# Patient Record
Sex: FEMALE | Race: WHITE | NOT HISPANIC OR LATINO | Employment: FULL TIME | RURAL
[De-identification: names, ages, dates, MRNs, and addresses within clinical notes are randomized per-mention and may not be internally consistent; named-entity substitution may affect disease eponyms.]

---

## 2021-05-25 ENCOUNTER — OFFICE VISIT (OUTPATIENT)
Dept: FAMILY MEDICINE | Facility: CLINIC | Age: 51
End: 2021-05-25
Payer: COMMERCIAL

## 2021-05-25 VITALS
BODY MASS INDEX: 38.03 KG/M2 | WEIGHT: 236.63 LBS | RESPIRATION RATE: 16 BRPM | DIASTOLIC BLOOD PRESSURE: 90 MMHG | HEART RATE: 80 BPM | SYSTOLIC BLOOD PRESSURE: 140 MMHG | HEIGHT: 66 IN

## 2021-05-25 DIAGNOSIS — I10 ESSENTIAL HYPERTENSION: ICD-10-CM

## 2021-05-25 DIAGNOSIS — Z79.899 ENCOUNTER FOR LONG-TERM (CURRENT) USE OF OTHER MEDICATIONS: Primary | ICD-10-CM

## 2021-05-25 DIAGNOSIS — M54.50 CHRONIC BILATERAL LOW BACK PAIN WITHOUT SCIATICA: ICD-10-CM

## 2021-05-25 DIAGNOSIS — G89.29 CHRONIC BILATERAL LOW BACK PAIN WITHOUT SCIATICA: ICD-10-CM

## 2021-05-25 LAB
CTP QC/QA: YES
POC (AMP) AMPHETAMINE: NEGATIVE
POC (BAR) BARBITURATES: NEGATIVE
POC (BUP) BUPRENORPHINE: NEGATIVE
POC (BZO) BENZODIAZEPINES: NEGATIVE
POC (COC) COCAINE: NEGATIVE
POC (MDMA) METHYLENEDIOXYMETHAMPHETAMINE 3,4: NEGATIVE
POC (MET) METHAMPHETAMINE: NEGATIVE
POC (MOP) OPIATES: NEGATIVE
POC (MTD) METHADONE: NEGATIVE
POC (OXY) OXYCODONE: NEGATIVE
POC (PCP) PHENCYCLIDINE: NEGATIVE
POC (TCA) TRICYCLIC ANTIDEPRESSANTS: NEGATIVE
POC TEMPERATURE (URINE): 90
POC THC: NEGATIVE

## 2021-05-25 PROCEDURE — 80305 DRUG TEST PRSMV DIR OPT OBS: CPT | Mod: QW,,, | Performed by: FAMILY MEDICINE

## 2021-05-25 PROCEDURE — 3008F BODY MASS INDEX DOCD: CPT | Mod: ,,, | Performed by: FAMILY MEDICINE

## 2021-05-25 PROCEDURE — 3008F PR BODY MASS INDEX (BMI) DOCUMENTED: ICD-10-PCS | Mod: ,,, | Performed by: FAMILY MEDICINE

## 2021-05-25 PROCEDURE — 80305 POCT URINE DRUG SCREEN PRESUMP: ICD-10-PCS | Mod: QW,,, | Performed by: FAMILY MEDICINE

## 2021-05-25 PROCEDURE — 99213 OFFICE O/P EST LOW 20 MIN: CPT | Mod: ,,, | Performed by: FAMILY MEDICINE

## 2021-05-25 PROCEDURE — 99213 PR OFFICE/OUTPT VISIT, EST, LEVL III, 20-29 MIN: ICD-10-PCS | Mod: ,,, | Performed by: FAMILY MEDICINE

## 2021-05-25 RX ORDER — TRAMADOL HYDROCHLORIDE 50 MG/1
TABLET ORAL
COMMUNITY
Start: 2021-05-03 | End: 2021-05-25 | Stop reason: SDUPTHER

## 2021-05-25 RX ORDER — VALSARTAN AND HYDROCHLOROTHIAZIDE 320; 12.5 MG/1; MG/1
1 TABLET, FILM COATED ORAL DAILY
COMMUNITY
Start: 2021-05-07 | End: 2023-05-11

## 2021-05-25 RX ORDER — TRAMADOL HYDROCHLORIDE 50 MG/1
TABLET ORAL
Qty: 180 TABLET | Refills: 1 | Status: SHIPPED | OUTPATIENT
Start: 2021-05-25 | End: 2021-11-30 | Stop reason: SDUPTHER

## 2021-11-30 ENCOUNTER — OFFICE VISIT (OUTPATIENT)
Dept: FAMILY MEDICINE | Facility: CLINIC | Age: 51
End: 2021-11-30
Payer: COMMERCIAL

## 2021-11-30 VITALS
WEIGHT: 234 LBS | TEMPERATURE: 98 F | RESPIRATION RATE: 16 BRPM | DIASTOLIC BLOOD PRESSURE: 100 MMHG | HEART RATE: 80 BPM | HEIGHT: 66 IN | BODY MASS INDEX: 37.61 KG/M2 | SYSTOLIC BLOOD PRESSURE: 170 MMHG

## 2021-11-30 DIAGNOSIS — M54.50 CHRONIC BILATERAL LOW BACK PAIN WITHOUT SCIATICA: ICD-10-CM

## 2021-11-30 DIAGNOSIS — G89.29 CHRONIC BILATERAL LOW BACK PAIN WITHOUT SCIATICA: ICD-10-CM

## 2021-11-30 DIAGNOSIS — Z79.899 ENCOUNTER FOR LONG-TERM (CURRENT) USE OF OTHER MEDICATIONS: Primary | ICD-10-CM

## 2021-11-30 LAB
CTP QC/QA: YES
POC (AMP) AMPHETAMINE: NEGATIVE
POC (BAR) BARBITURATES: NEGATIVE
POC (BUP) BUPRENORPHINE: NEGATIVE
POC (BZO) BENZODIAZEPINES: NEGATIVE
POC (COC) COCAINE: NEGATIVE
POC (MDMA) METHYLENEDIOXYMETHAMPHETAMINE 3,4: NEGATIVE
POC (MET) METHAMPHETAMINE: NEGATIVE
POC (MOP) OPIATES: NEGATIVE
POC (MTD) METHADONE: NEGATIVE
POC (OXY) OXYCODONE: NEGATIVE
POC (PCP) PHENCYCLIDINE: NEGATIVE
POC (TCA) TRICYCLIC ANTIDEPRESSANTS: NORMAL
POC TEMPERATURE (URINE): 90
POC THC: NEGATIVE

## 2021-11-30 PROCEDURE — 80305 POCT URINE DRUG SCREEN PRESUMP: ICD-10-PCS | Mod: QW,,, | Performed by: FAMILY MEDICINE

## 2021-11-30 PROCEDURE — 99213 PR OFFICE/OUTPT VISIT, EST, LEVL III, 20-29 MIN: ICD-10-PCS | Mod: ,,, | Performed by: FAMILY MEDICINE

## 2021-11-30 PROCEDURE — 99213 OFFICE O/P EST LOW 20 MIN: CPT | Mod: ,,, | Performed by: FAMILY MEDICINE

## 2021-11-30 PROCEDURE — 80305 DRUG TEST PRSMV DIR OPT OBS: CPT | Mod: QW,,, | Performed by: FAMILY MEDICINE

## 2021-11-30 RX ORDER — TRAMADOL HYDROCHLORIDE 50 MG/1
TABLET ORAL
Qty: 180 TABLET | Refills: 1 | Status: SHIPPED | OUTPATIENT
Start: 2021-11-30 | End: 2022-02-28 | Stop reason: SDUPTHER

## 2022-02-28 ENCOUNTER — OFFICE VISIT (OUTPATIENT)
Dept: FAMILY MEDICINE | Facility: CLINIC | Age: 52
End: 2022-02-28
Payer: COMMERCIAL

## 2022-02-28 VITALS
DIASTOLIC BLOOD PRESSURE: 98 MMHG | BODY MASS INDEX: 38.09 KG/M2 | HEART RATE: 77 BPM | SYSTOLIC BLOOD PRESSURE: 170 MMHG | HEIGHT: 66 IN | RESPIRATION RATE: 18 BRPM | WEIGHT: 237 LBS

## 2022-02-28 DIAGNOSIS — I10 ESSENTIAL HYPERTENSION: Primary | ICD-10-CM

## 2022-02-28 DIAGNOSIS — M54.50 CHRONIC BILATERAL LOW BACK PAIN WITHOUT SCIATICA: ICD-10-CM

## 2022-02-28 DIAGNOSIS — G89.29 CHRONIC BILATERAL LOW BACK PAIN WITHOUT SCIATICA: ICD-10-CM

## 2022-02-28 DIAGNOSIS — Z79.899 ENCOUNTER FOR LONG-TERM (CURRENT) USE OF OTHER MEDICATIONS: ICD-10-CM

## 2022-02-28 LAB
ALBUMIN SERPL BCP-MCNC: 3.8 G/DL (ref 3.5–5)
ALBUMIN/GLOB SERPL: 1.1 {RATIO}
ALP SERPL-CCNC: 61 U/L (ref 41–108)
ALT SERPL W P-5'-P-CCNC: 33 U/L (ref 13–56)
ANION GAP SERPL CALCULATED.3IONS-SCNC: 12 MMOL/L (ref 7–16)
AST SERPL W P-5'-P-CCNC: 21 U/L (ref 15–37)
BASOPHILS # BLD AUTO: 0.03 K/UL (ref 0–0.2)
BASOPHILS NFR BLD AUTO: 0.4 % (ref 0–1)
BILIRUB SERPL-MCNC: 0.5 MG/DL (ref 0–1.2)
BUN SERPL-MCNC: 11 MG/DL (ref 7–18)
BUN/CREAT SERPL: 17 (ref 6–20)
CALCIUM SERPL-MCNC: 9.1 MG/DL (ref 8.5–10.1)
CHLORIDE SERPL-SCNC: 101 MMOL/L (ref 98–107)
CHOLEST SERPL-MCNC: 225 MG/DL (ref 0–200)
CHOLEST/HDLC SERPL: 3.1 {RATIO}
CO2 SERPL-SCNC: 28 MMOL/L (ref 21–32)
CREAT SERPL-MCNC: 0.63 MG/DL (ref 0.55–1.02)
CTP QC/QA: YES
DIFFERENTIAL METHOD BLD: ABNORMAL
EOSINOPHIL # BLD AUTO: 0.06 K/UL (ref 0–0.5)
EOSINOPHIL NFR BLD AUTO: 0.8 % (ref 1–4)
ERYTHROCYTE [DISTWIDTH] IN BLOOD BY AUTOMATED COUNT: 13.2 % (ref 11.5–14.5)
GLOBULIN SER-MCNC: 3.4 G/DL (ref 2–4)
GLUCOSE SERPL-MCNC: 89 MG/DL (ref 74–106)
HCT VFR BLD AUTO: 42 % (ref 38–47)
HDLC SERPL-MCNC: 73 MG/DL (ref 40–60)
HGB BLD-MCNC: 13.8 G/DL (ref 12–16)
IMM GRANULOCYTES # BLD AUTO: 0.02 K/UL (ref 0–0.04)
IMM GRANULOCYTES NFR BLD: 0.3 % (ref 0–0.4)
LDLC SERPL CALC-MCNC: 129 MG/DL
LDLC/HDLC SERPL: 1.8 {RATIO}
LYMPHOCYTES # BLD AUTO: 2.23 K/UL (ref 1–4.8)
LYMPHOCYTES NFR BLD AUTO: 29.9 % (ref 27–41)
MCH RBC QN AUTO: 29 PG (ref 27–31)
MCHC RBC AUTO-ENTMCNC: 32.9 G/DL (ref 32–36)
MCV RBC AUTO: 88.2 FL (ref 80–96)
MONOCYTES # BLD AUTO: 0.44 K/UL (ref 0–0.8)
MONOCYTES NFR BLD AUTO: 5.9 % (ref 2–6)
MPC BLD CALC-MCNC: 9.6 FL (ref 9.4–12.4)
NEUTROPHILS # BLD AUTO: 4.67 K/UL (ref 1.8–7.7)
NEUTROPHILS NFR BLD AUTO: 62.7 % (ref 53–65)
NONHDLC SERPL-MCNC: 152 MG/DL
NRBC # BLD AUTO: 0 X10E3/UL
NRBC, AUTO (.00): 0 %
PLATELET # BLD AUTO: 302 K/UL (ref 150–400)
POC (AMP) AMPHETAMINE: NEGATIVE
POC (BAR) BARBITURATES: NEGATIVE
POC (BUP) BUPRENORPHINE: NEGATIVE
POC (BZO) BENZODIAZEPINES: NEGATIVE
POC (COC) COCAINE: NEGATIVE
POC (MDMA) METHYLENEDIOXYMETHAMPHETAMINE 3,4: NEGATIVE
POC (MET) METHAMPHETAMINE: NEGATIVE
POC (MOP) OPIATES: NEGATIVE
POC (MTD) METHADONE: NEGATIVE
POC (OXY) OXYCODONE: NEGATIVE
POC (PCP) PHENCYCLIDINE: NEGATIVE
POC (TCA) TRICYCLIC ANTIDEPRESSANTS: NORMAL
POC TEMPERATURE (URINE): 90
POC THC: NEGATIVE
POTASSIUM SERPL-SCNC: 3.5 MMOL/L (ref 3.5–5.1)
PROT SERPL-MCNC: 7.2 G/DL (ref 6.4–8.2)
RBC # BLD AUTO: 4.76 M/UL (ref 4.2–5.4)
SODIUM SERPL-SCNC: 137 MMOL/L (ref 136–145)
TRIGL SERPL-MCNC: 113 MG/DL (ref 35–150)
VLDLC SERPL-MCNC: 23 MG/DL
WBC # BLD AUTO: 7.45 K/UL (ref 4.5–11)

## 2022-02-28 PROCEDURE — 3008F BODY MASS INDEX DOCD: CPT | Mod: ,,, | Performed by: FAMILY MEDICINE

## 2022-02-28 PROCEDURE — 3080F DIAST BP >= 90 MM HG: CPT | Mod: ,,, | Performed by: FAMILY MEDICINE

## 2022-02-28 PROCEDURE — 1160F PR REVIEW ALL MEDS BY PRESCRIBER/CLIN PHARMACIST DOCUMENTED: ICD-10-PCS | Mod: ,,, | Performed by: FAMILY MEDICINE

## 2022-02-28 PROCEDURE — 80305 DRUG TEST PRSMV DIR OPT OBS: CPT | Mod: QW,,, | Performed by: FAMILY MEDICINE

## 2022-02-28 PROCEDURE — 1159F MED LIST DOCD IN RCRD: CPT | Mod: ,,, | Performed by: FAMILY MEDICINE

## 2022-02-28 PROCEDURE — 80053 COMPREHENSIVE METABOLIC PANEL: ICD-10-PCS | Mod: ,,, | Performed by: CLINICAL MEDICAL LABORATORY

## 2022-02-28 PROCEDURE — 1159F PR MEDICATION LIST DOCUMENTED IN MEDICAL RECORD: ICD-10-PCS | Mod: ,,, | Performed by: FAMILY MEDICINE

## 2022-02-28 PROCEDURE — 3077F PR MOST RECENT SYSTOLIC BLOOD PRESSURE >= 140 MM HG: ICD-10-PCS | Mod: ,,, | Performed by: FAMILY MEDICINE

## 2022-02-28 PROCEDURE — 85025 CBC WITH DIFFERENTIAL: ICD-10-PCS | Mod: ,,, | Performed by: CLINICAL MEDICAL LABORATORY

## 2022-02-28 PROCEDURE — 1160F RVW MEDS BY RX/DR IN RCRD: CPT | Mod: ,,, | Performed by: FAMILY MEDICINE

## 2022-02-28 PROCEDURE — 80305 POCT URINE DRUG SCREEN PRESUMP: ICD-10-PCS | Mod: QW,,, | Performed by: FAMILY MEDICINE

## 2022-02-28 PROCEDURE — 85025 COMPLETE CBC W/AUTO DIFF WBC: CPT | Mod: ,,, | Performed by: CLINICAL MEDICAL LABORATORY

## 2022-02-28 PROCEDURE — 3080F PR MOST RECENT DIASTOLIC BLOOD PRESSURE >= 90 MM HG: ICD-10-PCS | Mod: ,,, | Performed by: FAMILY MEDICINE

## 2022-02-28 PROCEDURE — 99214 PR OFFICE/OUTPT VISIT, EST, LEVL IV, 30-39 MIN: ICD-10-PCS | Mod: ,,, | Performed by: FAMILY MEDICINE

## 2022-02-28 PROCEDURE — 99214 OFFICE O/P EST MOD 30 MIN: CPT | Mod: ,,, | Performed by: FAMILY MEDICINE

## 2022-02-28 PROCEDURE — 3008F PR BODY MASS INDEX (BMI) DOCUMENTED: ICD-10-PCS | Mod: ,,, | Performed by: FAMILY MEDICINE

## 2022-02-28 PROCEDURE — 80061 LIPID PANEL: ICD-10-PCS | Mod: ,,, | Performed by: CLINICAL MEDICAL LABORATORY

## 2022-02-28 PROCEDURE — 3077F SYST BP >= 140 MM HG: CPT | Mod: ,,, | Performed by: FAMILY MEDICINE

## 2022-02-28 PROCEDURE — 80061 LIPID PANEL: CPT | Mod: ,,, | Performed by: CLINICAL MEDICAL LABORATORY

## 2022-02-28 PROCEDURE — 80053 COMPREHEN METABOLIC PANEL: CPT | Mod: ,,, | Performed by: CLINICAL MEDICAL LABORATORY

## 2022-02-28 RX ORDER — CHLORTHALIDONE 25 MG/1
25 TABLET ORAL DAILY
Qty: 90 TABLET | Refills: 3 | Status: SHIPPED | OUTPATIENT
Start: 2022-02-28 | End: 2022-08-26 | Stop reason: SDUPTHER

## 2022-02-28 RX ORDER — TRAMADOL HYDROCHLORIDE 50 MG/1
TABLET ORAL
Qty: 180 TABLET | Refills: 1 | Status: SHIPPED | OUTPATIENT
Start: 2022-02-28 | End: 2022-08-26 | Stop reason: SDUPTHER

## 2022-02-28 NOTE — PROGRESS NOTES
Anthony Monge MD        PATIENT NAME: Celine Chandler  : 1970  DATE: 22  MRN: [id number]      Billing Provider: Anthony Monge MD  Level of Service: [place] OFFICE/OUTPT VISIT, EST, LEVL IV, 30-39 MIN  Patient PCP Information     Provider PCP Type    Anthony Monge MD General          Reason for Visit / Chief Complaint: No chief complaint on file.       Update PCP  Update Chief Complaint         History of Present Illness / Problem Focused Workflow     Celine Chandler presents to the clinic with No chief complaint on file.     For follow-up of hypertension.  She was switched to valsartan due to an Ace cough but now has noticed a cough on valsartan as well      Review of Systems     Review of Systems   Constitutional: Negative for activity change, appetite change, fever and unexpected weight change.   HENT: Negative for congestion, rhinorrhea, sinus pressure, sinus pain, sore throat and trouble swallowing.    Eyes: Negative for photophobia, pain, discharge and visual disturbance.   Respiratory: Positive for cough. Negative for chest tightness, wheezing and stridor.    Cardiovascular: Negative for chest pain, palpitations and leg swelling.   Gastrointestinal: Negative for abdominal pain, blood in stool, constipation, diarrhea and nausea.   Endocrine: Negative for polydipsia, polyphagia and polyuria.   Genitourinary: Negative for difficulty urinating, flank pain and hematuria.   Musculoskeletal: Positive for arthralgias and back pain. Negative for neck pain.   Skin: Negative for rash.   Allergic/Immunologic: Negative for food allergies.   Neurological: Negative for dizziness, tremors, seizures, syncope, weakness (global weakness) and headaches.   Psychiatric/Behavioral: Negative for behavioral problems, confusion, decreased concentration, dysphoric mood and hallucinations. The patient is not nervous/anxious.         Medical / Social / Family History     Past Medical History:   Diagnosis Date    Arthritis      Hypertension        Past Surgical History:   Procedure Laterality Date     SECTION      CHOLECYSTECTOMY         Social History  Ms.  reports that she has never smoked. She has never used smokeless tobacco.    Family History  MsDorinda's family history includes Cancer in her mother; Hypertension in her father and mother.    Medications and Allergies     Medications  Outpatient Medications Marked as Taking for the 22 encounter (Office Visit) with Anthony Monge MD   Medication Sig Dispense Refill    valsartan-hydrochlorothiazide (DIOVAN-HCT) 320-12.5 mg per tablet Take 1 tablet by mouth once daily.      [DISCONTINUED] traMADoL (ULTRAM) 50 mg tablet TAKE 1 TABLET BY MOUTH TWICE A DAY WITH FOOD ..MAY CAUSE DROWSINESS- AVOID ALCOHOL 180 tablet 1       Allergies  Review of patient's allergies indicates:  No Known Allergies    Physical Examination     Vitals:    22 1558   BP: (!) 170/98   Pulse: 77   Resp: 18     Physical Exam  Constitutional:       General: She is not in acute distress.     Appearance: Normal appearance.   HENT:      Head: Normocephalic.      Right Ear: Tympanic membrane and ear canal normal.      Left Ear: Tympanic membrane and ear canal normal.      Nose: Nose normal.      Mouth/Throat:      Mouth: Mucous membranes are moist.      Pharynx: No oropharyngeal exudate.   Eyes:      Extraocular Movements: Extraocular movements intact.      Pupils: Pupils are equal, round, and reactive to light.   Cardiovascular:      Rate and Rhythm: Normal rate and regular rhythm.      Heart sounds: No murmur heard.  Pulmonary:      Effort: Pulmonary effort is normal.      Breath sounds: Normal breath sounds. No wheezing.   Abdominal:      General: Abdomen is flat. Bowel sounds are normal.      Palpations: Abdomen is soft.      Hernia: No hernia is present.   Musculoskeletal:         General: Normal range of motion.      Cervical back: Normal range of motion and neck supple.      Right lower leg: No  edema.      Left lower leg: No edema.   Lymphadenopathy:      Cervical: No cervical adenopathy.   Skin:     General: Skin is warm and dry.      Coloration: Skin is not jaundiced.      Findings: No lesion.   Neurological:      General: No focal deficit present.      Mental Status: She is alert and oriented to person, place, and time.      Cranial Nerves: No cranial nerve deficit.      Gait: Gait normal.   Psychiatric:         Mood and Affect: Mood normal.         Behavior: Behavior normal.         Judgment: Judgment normal.          Assessment and Plan (including Health Maintenance)      Problem List  Smart Sets  Document Outside HM   :    Plan:   Essential hypertension  -     CBC Auto Differential  -     Comprehensive Metabolic Panel  -     Lipid Panel  -     chlorthalidone (HYGROTEN) 25 MG Tab; Take 1 tablet (25 mg total) by mouth once daily.  Dispense: 90 tablet; Refill: 3    Encounter for long-term (current) use of other medications  -     POCT Urine Drug Screen Presump    Chronic bilateral low back pain without sciatica  -     traMADoL (ULTRAM) 50 mg tablet; TAKE 1 TABLET BY MOUTH TWICE A DAY WITH FOOD ..MAY CAUSE DROWSINESS- AVOID ALCOHOL  Dispense: 180 tablet; Refill: 1           Health Maintenance Due   Topic Date Due    Hepatitis C Screening  Never done    Cervical Cancer Screening  Never done    Lipid Panel  Never done    COVID-19 Vaccine (1) Never done    HIV Screening  Never done    TETANUS VACCINE  Never done    Mammogram  Never done    Colorectal Cancer Screening  Never done    Shingles Vaccine (1 of 2) Never done    Influenza Vaccine (1) Never done       Problem List Items Addressed This Visit        Cardiac/Vascular    Essential hypertension - Primary    Relevant Medications    chlorthalidone (HYGROTEN) 25 MG Tab    Other Relevant Orders    CBC Auto Differential    Comprehensive Metabolic Panel    Lipid Panel      Other Visit Diagnoses     Encounter for long-term (current) use of other  medications        Relevant Orders    POCT Urine Drug Screen Presump (Completed)    Chronic bilateral low back pain without sciatica        Relevant Medications    traMADoL (ULTRAM) 50 mg tablet          The patient has no Health Maintenance topics of status Not Due    Future Appointments   Date Time Provider Department Center   3/21/2022  3:30 PM Anthony Monge MD [facility]        There are no Patient Instructions on file for this visit.  Follow up in about 4 weeks (around 3/28/2022) for routine followup.     Signature:  Anthony Monge MD      Date of encounter: 2/28/22

## 2022-03-20 ENCOUNTER — PATIENT MESSAGE (OUTPATIENT)
Dept: FAMILY MEDICINE | Facility: CLINIC | Age: 52
End: 2022-03-20
Payer: COMMERCIAL

## 2022-03-22 DIAGNOSIS — Z29.9 UNSPECIFIED PROPHYLACTIC OR TREATMENT MEASURE: Primary | ICD-10-CM

## 2022-03-22 RX ORDER — POTASSIUM CHLORIDE 600 MG/1
8 TABLET, FILM COATED, EXTENDED RELEASE ORAL ONCE
COMMUNITY
Start: 2022-03-22 | End: 2022-03-22 | Stop reason: SDUPTHER

## 2022-03-22 RX ORDER — POTASSIUM CHLORIDE 600 MG/1
8 TABLET, FILM COATED, EXTENDED RELEASE ORAL ONCE
Qty: 30 TABLET | Refills: 0 | Status: SHIPPED | OUTPATIENT
Start: 2022-03-22 | End: 2022-03-22

## 2022-08-26 ENCOUNTER — OFFICE VISIT (OUTPATIENT)
Dept: FAMILY MEDICINE | Facility: CLINIC | Age: 52
End: 2022-08-26
Payer: COMMERCIAL

## 2022-08-26 VITALS
BODY MASS INDEX: 38.73 KG/M2 | HEART RATE: 78 BPM | SYSTOLIC BLOOD PRESSURE: 122 MMHG | HEIGHT: 66 IN | DIASTOLIC BLOOD PRESSURE: 68 MMHG | OXYGEN SATURATION: 98 % | RESPIRATION RATE: 18 BRPM | WEIGHT: 241 LBS

## 2022-08-26 DIAGNOSIS — I10 ESSENTIAL HYPERTENSION: ICD-10-CM

## 2022-08-26 DIAGNOSIS — G89.29 CHRONIC BILATERAL LOW BACK PAIN WITHOUT SCIATICA: Primary | ICD-10-CM

## 2022-08-26 DIAGNOSIS — M54.50 CHRONIC BILATERAL LOW BACK PAIN WITHOUT SCIATICA: Primary | ICD-10-CM

## 2022-08-26 DIAGNOSIS — Z79.899 ENCOUNTER FOR LONG-TERM (CURRENT) USE OF OTHER MEDICATIONS: ICD-10-CM

## 2022-08-26 LAB
CTP QC/QA: YES
POC (AMP) AMPHETAMINE: NEGATIVE
POC (BAR) BARBITURATES: NEGATIVE
POC (BUP) BUPRENORPHINE: NEGATIVE
POC (BZO) BENZODIAZEPINES: NEGATIVE
POC (COC) COCAINE: NEGATIVE
POC (MDMA) METHYLENEDIOXYMETHAMPHETAMINE 3,4: NEGATIVE
POC (MET) METHAMPHETAMINE: NEGATIVE
POC (MOP) OPIATES: NEGATIVE
POC (MTD) METHADONE: NEGATIVE
POC (OXY) OXYCODONE: NEGATIVE
POC (PCP) PHENCYCLIDINE: NEGATIVE
POC (TCA) TRICYCLIC ANTIDEPRESSANTS: NORMAL
POC TEMPERATURE (URINE): 92
POC THC: NEGATIVE

## 2022-08-26 PROCEDURE — 3078F PR MOST RECENT DIASTOLIC BLOOD PRESSURE < 80 MM HG: ICD-10-PCS | Mod: ,,, | Performed by: FAMILY MEDICINE

## 2022-08-26 PROCEDURE — 3074F PR MOST RECENT SYSTOLIC BLOOD PRESSURE < 130 MM HG: ICD-10-PCS | Mod: ,,, | Performed by: FAMILY MEDICINE

## 2022-08-26 PROCEDURE — 3078F DIAST BP <80 MM HG: CPT | Mod: ,,, | Performed by: FAMILY MEDICINE

## 2022-08-26 PROCEDURE — 3008F PR BODY MASS INDEX (BMI) DOCUMENTED: ICD-10-PCS | Mod: ,,, | Performed by: FAMILY MEDICINE

## 2022-08-26 PROCEDURE — 3008F BODY MASS INDEX DOCD: CPT | Mod: ,,, | Performed by: FAMILY MEDICINE

## 2022-08-26 PROCEDURE — 80305 DRUG TEST PRSMV DIR OPT OBS: CPT | Mod: QW,,, | Performed by: FAMILY MEDICINE

## 2022-08-26 PROCEDURE — 3074F SYST BP LT 130 MM HG: CPT | Mod: ,,, | Performed by: FAMILY MEDICINE

## 2022-08-26 PROCEDURE — 1159F PR MEDICATION LIST DOCUMENTED IN MEDICAL RECORD: ICD-10-PCS | Mod: ,,, | Performed by: FAMILY MEDICINE

## 2022-08-26 PROCEDURE — 99213 OFFICE O/P EST LOW 20 MIN: CPT | Mod: ,,, | Performed by: FAMILY MEDICINE

## 2022-08-26 PROCEDURE — 99213 PR OFFICE/OUTPT VISIT, EST, LEVL III, 20-29 MIN: ICD-10-PCS | Mod: ,,, | Performed by: FAMILY MEDICINE

## 2022-08-26 PROCEDURE — 1160F PR REVIEW ALL MEDS BY PRESCRIBER/CLIN PHARMACIST DOCUMENTED: ICD-10-PCS | Mod: ,,, | Performed by: FAMILY MEDICINE

## 2022-08-26 PROCEDURE — 1159F MED LIST DOCD IN RCRD: CPT | Mod: ,,, | Performed by: FAMILY MEDICINE

## 2022-08-26 PROCEDURE — 80305 POCT URINE DRUG SCREEN PRESUMP: ICD-10-PCS | Mod: QW,,, | Performed by: FAMILY MEDICINE

## 2022-08-26 PROCEDURE — 1160F RVW MEDS BY RX/DR IN RCRD: CPT | Mod: ,,, | Performed by: FAMILY MEDICINE

## 2022-08-26 RX ORDER — CHLORTHALIDONE 25 MG/1
25 TABLET ORAL DAILY
Qty: 90 TABLET | Refills: 3 | Status: SHIPPED | OUTPATIENT
Start: 2022-08-26 | End: 2022-11-29 | Stop reason: SDUPTHER

## 2022-08-26 RX ORDER — TRAMADOL HYDROCHLORIDE 50 MG/1
TABLET ORAL
Qty: 180 TABLET | Refills: 1 | Status: SHIPPED | OUTPATIENT
Start: 2022-08-26 | End: 2022-11-29 | Stop reason: SDUPTHER

## 2022-08-26 NOTE — PROGRESS NOTES
Anthony Monge MD        PATIENT NAME: Celine Chandler  : 1970  DATE: 22  MRN: [id number]      Billing Provider: Anthony Monge MD  Level of Service: [place] OFFICE/OUTPT VISIT, EST, LEVL III, 20-29 MIN  Patient PCP Information       Provider PCP Type    Anthony Monge MD General            Reason for Visit / Chief Complaint: Medication Refill       Update PCP  Update Chief Complaint         History of Present Illness / Problem Focused Workflow     Celine Chandler presents to the clinic with Medication Refill     Routine followup.  No significant interval change.      Medication Refill  Pertinent negatives include no abdominal pain, arthralgias, chest pain, congestion, coughing, fever, headaches, nausea, neck pain, rash, sore throat or weakness (global weakness).     Review of Systems     Review of Systems   Constitutional:  Negative for activity change, appetite change, fever and unexpected weight change.   HENT:  Negative for congestion, rhinorrhea, sinus pressure, sinus pain, sore throat and trouble swallowing.    Eyes:  Negative for photophobia, pain, discharge and visual disturbance.   Respiratory:  Negative for cough, chest tightness, wheezing and stridor.    Cardiovascular:  Negative for chest pain, palpitations and leg swelling.   Gastrointestinal:  Negative for abdominal pain, blood in stool, constipation, diarrhea and nausea.   Endocrine: Negative for polydipsia, polyphagia and polyuria.   Genitourinary:  Negative for difficulty urinating, flank pain and hematuria.   Musculoskeletal:  Positive for back pain. Negative for arthralgias and neck pain.   Skin:  Negative for rash.   Allergic/Immunologic: Negative for food allergies.   Neurological:  Negative for dizziness, tremors, seizures, syncope, weakness (global weakness) and headaches.   Psychiatric/Behavioral:  Negative for behavioral problems, confusion, decreased concentration, dysphoric mood and hallucinations. The patient is not nervous/anxious.        Medical / Social / Family History     Past Medical History:   Diagnosis Date    Arthritis     Hypertension        Past Surgical History:   Procedure Laterality Date     SECTION      CHOLECYSTECTOMY         Social History  Ms.  reports that she has never smoked. She has never used smokeless tobacco.    Family History  Ms.'s family history includes Cancer in her mother; Hypertension in her father and mother.    Medications and Allergies     Medications  No outpatient medications have been marked as taking for the 22 encounter (Office Visit) with Anthony Monge MD.       Allergies  Review of patient's allergies indicates:  No Known Allergies    Physical Examination     Vitals:    22 1020   BP: 122/68   Pulse: 78   Resp: 18     Physical Exam  Constitutional:       General: She is not in acute distress.     Appearance: Normal appearance.   HENT:      Head: Normocephalic.      Right Ear: Tympanic membrane and ear canal normal.      Left Ear: Tympanic membrane and ear canal normal.      Nose: Nose normal.      Mouth/Throat:      Mouth: Mucous membranes are moist.      Pharynx: No oropharyngeal exudate.   Eyes:      Extraocular Movements: Extraocular movements intact.      Pupils: Pupils are equal, round, and reactive to light.   Cardiovascular:      Rate and Rhythm: Normal rate and regular rhythm.      Heart sounds: No murmur heard.  Pulmonary:      Effort: Pulmonary effort is normal.      Breath sounds: Normal breath sounds. No wheezing.   Abdominal:      General: Abdomen is flat. Bowel sounds are normal.      Palpations: Abdomen is soft.      Hernia: No hernia is present.   Musculoskeletal:         General: Normal range of motion.      Cervical back: Normal range of motion and neck supple.      Right lower leg: No edema.      Left lower leg: No edema.   Lymphadenopathy:      Cervical: No cervical adenopathy.   Skin:     General: Skin is warm and dry.      Coloration: Skin is not jaundiced.       Findings: No lesion.   Neurological:      General: No focal deficit present.      Mental Status: She is alert and oriented to person, place, and time.      Cranial Nerves: No cranial nerve deficit.      Gait: Gait normal.   Psychiatric:         Mood and Affect: Mood normal.         Behavior: Behavior normal.         Judgment: Judgment normal.        Assessment and Plan (including Health Maintenance)      Problem List  Smart Sets  Document Outside HM   :    Plan:   Chronic bilateral low back pain without sciatica  -     traMADoL (ULTRAM) 50 mg tablet; TAKE 1 TABLET BY MOUTH TWICE A DAY WITH FOOD ..MAY CAUSE DROWSINESS- AVOID ALCOHOL  Dispense: 180 tablet; Refill: 1    Encounter for long-term (current) use of other medications  -     POCT Urine Drug Screen Presump    Essential hypertension  -     chlorthalidone (HYGROTEN) 25 MG Tab; Take 1 tablet (25 mg total) by mouth once daily.  Dispense: 90 tablet; Refill: 3         Health Maintenance Due   Topic Date Due    Hepatitis C Screening  Never done    Cervical Cancer Screening  Never done    COVID-19 Vaccine (1) Never done    HIV Screening  Never done    TETANUS VACCINE  Never done    Mammogram  Never done    Colorectal Cancer Screening  Never done    Shingles Vaccine (1 of 2) Never done       Problem List Items Addressed This Visit          Cardiac/Vascular    Essential hypertension    Relevant Medications    chlorthalidone (HYGROTEN) 25 MG Tab     Other Visit Diagnoses       Chronic bilateral low back pain without sciatica    -  Primary    Relevant Medications    traMADoL (ULTRAM) 50 mg tablet    Encounter for long-term (current) use of other medications        Relevant Orders    POCT Urine Drug Screen Presump (Completed)            Health Maintenance Topics with due status: Not Due       Topic Last Completion Date    Lipid Panel 02/28/2022    Influenza Vaccine Not Due       Future Appointments   Date Time Provider Department Center   11/4/2022  8:30 AM Anthony GRAYSON  MD [facility]        There are no Patient Instructions on file for this visit.  Follow up in about 3 months (around 11/26/2022) for routine followup.     Signature:  Anthony Monge MD      Date of encounter: 8/26/22

## 2022-11-29 ENCOUNTER — OFFICE VISIT (OUTPATIENT)
Dept: FAMILY MEDICINE | Facility: CLINIC | Age: 52
End: 2022-11-29
Payer: COMMERCIAL

## 2022-11-29 VITALS
RESPIRATION RATE: 16 BRPM | BODY MASS INDEX: 36.96 KG/M2 | WEIGHT: 230 LBS | DIASTOLIC BLOOD PRESSURE: 72 MMHG | SYSTOLIC BLOOD PRESSURE: 126 MMHG | HEART RATE: 76 BPM | HEIGHT: 66 IN

## 2022-11-29 DIAGNOSIS — Z00.00 ROUTINE GENERAL MEDICAL EXAMINATION AT A HEALTH CARE FACILITY: ICD-10-CM

## 2022-11-29 DIAGNOSIS — M54.50 CHRONIC BILATERAL LOW BACK PAIN WITHOUT SCIATICA: ICD-10-CM

## 2022-11-29 DIAGNOSIS — Z13.220 SCREENING FOR LIPOID DISORDERS: ICD-10-CM

## 2022-11-29 DIAGNOSIS — Z79.899 ENCOUNTER FOR LONG-TERM (CURRENT) USE OF OTHER MEDICATIONS: ICD-10-CM

## 2022-11-29 DIAGNOSIS — I10 ESSENTIAL HYPERTENSION: ICD-10-CM

## 2022-11-29 DIAGNOSIS — G89.29 CHRONIC BILATERAL LOW BACK PAIN WITHOUT SCIATICA: ICD-10-CM

## 2022-11-29 DIAGNOSIS — Z13.1 SCREENING FOR DIABETES MELLITUS: Primary | ICD-10-CM

## 2022-11-29 LAB
CHOLEST SERPL-MCNC: 240 MG/DL (ref 0–200)
CHOLEST/HDLC SERPL: 3.1 {RATIO}
CTP QC/QA: YES
GLUCOSE SERPL-MCNC: 90 MG/DL (ref 74–106)
HDLC SERPL-MCNC: 77 MG/DL (ref 40–60)
LDLC SERPL CALC-MCNC: 142 MG/DL
LDLC/HDLC SERPL: 1.8 {RATIO}
NONHDLC SERPL-MCNC: 163 MG/DL
POC (AMP) AMPHETAMINE: NEGATIVE
POC (BAR) BARBITURATES: NEGATIVE
POC (BUP) BUPRENORPHINE: NEGATIVE
POC (BZO) BENZODIAZEPINES: NEGATIVE
POC (COC) COCAINE: NEGATIVE
POC (MDMA) METHYLENEDIOXYMETHAMPHETAMINE 3,4: NEGATIVE
POC (MET) METHAMPHETAMINE: NEGATIVE
POC (MOP) OPIATES: NEGATIVE
POC (MTD) METHADONE: NEGATIVE
POC (OXY) OXYCODONE: NEGATIVE
POC (PCP) PHENCYCLIDINE: NEGATIVE
POC (TCA) TRICYCLIC ANTIDEPRESSANTS: NORMAL
POC TEMPERATURE (URINE): 90
POC THC: NEGATIVE
TRIGL SERPL-MCNC: 103 MG/DL (ref 35–150)
VLDLC SERPL-MCNC: 21 MG/DL

## 2022-11-29 PROCEDURE — 3008F PR BODY MASS INDEX (BMI) DOCUMENTED: ICD-10-PCS | Mod: ,,, | Performed by: FAMILY MEDICINE

## 2022-11-29 PROCEDURE — 3074F SYST BP LT 130 MM HG: CPT | Mod: ,,, | Performed by: FAMILY MEDICINE

## 2022-11-29 PROCEDURE — 82947 GLUCOSE, FASTING: ICD-10-PCS | Mod: ,,, | Performed by: CLINICAL MEDICAL LABORATORY

## 2022-11-29 PROCEDURE — 80061 LIPID PANEL: ICD-10-PCS | Mod: ,,, | Performed by: CLINICAL MEDICAL LABORATORY

## 2022-11-29 PROCEDURE — 1159F PR MEDICATION LIST DOCUMENTED IN MEDICAL RECORD: ICD-10-PCS | Mod: ,,, | Performed by: FAMILY MEDICINE

## 2022-11-29 PROCEDURE — 3078F PR MOST RECENT DIASTOLIC BLOOD PRESSURE < 80 MM HG: ICD-10-PCS | Mod: ,,, | Performed by: FAMILY MEDICINE

## 2022-11-29 PROCEDURE — 80305 DRUG TEST PRSMV DIR OPT OBS: CPT | Mod: QW,,, | Performed by: FAMILY MEDICINE

## 2022-11-29 PROCEDURE — 99396 PREV VISIT EST AGE 40-64: CPT | Mod: ,,, | Performed by: FAMILY MEDICINE

## 2022-11-29 PROCEDURE — 3078F DIAST BP <80 MM HG: CPT | Mod: ,,, | Performed by: FAMILY MEDICINE

## 2022-11-29 PROCEDURE — 1159F MED LIST DOCD IN RCRD: CPT | Mod: ,,, | Performed by: FAMILY MEDICINE

## 2022-11-29 PROCEDURE — 80305 POCT URINE DRUG SCREEN PRESUMP: ICD-10-PCS | Mod: QW,,, | Performed by: FAMILY MEDICINE

## 2022-11-29 PROCEDURE — 1160F RVW MEDS BY RX/DR IN RCRD: CPT | Mod: ,,, | Performed by: FAMILY MEDICINE

## 2022-11-29 PROCEDURE — 3074F PR MOST RECENT SYSTOLIC BLOOD PRESSURE < 130 MM HG: ICD-10-PCS | Mod: ,,, | Performed by: FAMILY MEDICINE

## 2022-11-29 PROCEDURE — 99396 PR PREVENTIVE VISIT,EST,40-64: ICD-10-PCS | Mod: ,,, | Performed by: FAMILY MEDICINE

## 2022-11-29 PROCEDURE — 3008F BODY MASS INDEX DOCD: CPT | Mod: ,,, | Performed by: FAMILY MEDICINE

## 2022-11-29 PROCEDURE — 80061 LIPID PANEL: CPT | Mod: ,,, | Performed by: CLINICAL MEDICAL LABORATORY

## 2022-11-29 PROCEDURE — 1160F PR REVIEW ALL MEDS BY PRESCRIBER/CLIN PHARMACIST DOCUMENTED: ICD-10-PCS | Mod: ,,, | Performed by: FAMILY MEDICINE

## 2022-11-29 PROCEDURE — 82947 ASSAY GLUCOSE BLOOD QUANT: CPT | Mod: ,,, | Performed by: CLINICAL MEDICAL LABORATORY

## 2022-11-29 RX ORDER — TRAMADOL HYDROCHLORIDE 50 MG/1
50 TABLET ORAL 2 TIMES DAILY
Qty: 180 TABLET | Refills: 1 | Status: SHIPPED | OUTPATIENT
Start: 2022-11-29 | End: 2023-07-12 | Stop reason: SDUPTHER

## 2022-11-29 RX ORDER — CHLORTHALIDONE 25 MG/1
25 TABLET ORAL DAILY
Qty: 90 TABLET | Refills: 3 | Status: SHIPPED | OUTPATIENT
Start: 2022-11-29 | End: 2023-12-27

## 2022-11-29 RX ORDER — HYDROCORTISONE ACETATE 25 MG/1
25 SUPPOSITORY RECTAL 2 TIMES DAILY
Qty: 20 SUPPOSITORY | Refills: 2 | Status: SHIPPED | OUTPATIENT
Start: 2022-11-29 | End: 2022-12-09

## 2022-11-29 NOTE — PROGRESS NOTES
Subjective     Patient ID: Celine Chandler is a 52 y.o. female.    Chief Complaint: Healthy You (Z00.00)    Healthy You    Review of Systems   Constitutional:  Negative for activity change, appetite change, fatigue, fever and unexpected weight change.   HENT:  Negative for nasal congestion, dental problem, ear pain, hearing loss, mouth sores, nosebleeds, sore throat, tinnitus and voice change.    Eyes:  Negative for pain, discharge and visual disturbance.   Respiratory:  Negative for apnea, choking, chest tightness, shortness of breath and wheezing.    Cardiovascular:  Negative for chest pain, palpitations, leg swelling and claudication.   Gastrointestinal:  Negative for abdominal pain, blood in stool, change in bowel habit and change in bowel habit.   Endocrine: Negative for polydipsia, polyphagia and polyuria.   Genitourinary:  Negative for bladder incontinence, dysuria, flank pain, genital sores, hematuria, hot flashes and menstrual irregularity.   Musculoskeletal:  Negative for arthralgias, gait problem, neck pain and neck stiffness.   Integumentary:  Negative for rash, wound, mole/lesion, breast mass and breast discharge.   Allergic/Immunologic: Negative for food allergies and immunocompromised state.   Neurological:  Negative for seizures, syncope, headaches, coordination difficulties, memory loss and coordination difficulties.   Hematological:  Negative for adenopathy. Does not bruise/bleed easily.   Psychiatric/Behavioral:  Negative for agitation, behavioral problems, confusion, decreased concentration, hallucinations, self-injury, sleep disturbance and suicidal ideas. The patient is not nervous/anxious.    Breast: Negative for mass    Tobacco Use: Low Risk     Smoking Tobacco Use: Never    Smokeless Tobacco Use: Never    Passive Exposure: Not on file     Review of patient's allergies indicates:  No Known Allergies  Current Outpatient Medications   Medication Instructions    chlorthalidone (HYGROTEN) 25 mg, Oral,  "Daily    estradioL (ESTRACE) 1 MG tablet TAKE 1 TABLET BY MOUTH ONCE DAILY on days 1-21, THEN 7 DAYS off    hydrocortisone (ANUSOL-HC) 25 mg, Rectal, 2 times daily    traMADoL (ULTRAM) 50 mg, Oral, 2 times daily, TAKE 1 TABLET BY MOUTH TWICE A DAY WITH FOOD ..MAY CAUSE DROWSINESS- AVOID ALCOHOL    valsartan-hydrochlorothiazide (DIOVAN-HCT) 320-12.5 mg per tablet 1 tablet, Oral, Daily     Medications Discontinued During This Encounter   Medication Reason    chlorthalidone (HYGROTEN) 25 MG Tab Reorder    traMADoL (ULTRAM) 50 mg tablet Reorder       Past Medical History:   Diagnosis Date    Arthritis     Hypertension      Health Maintenance Topics with due status: Not Due       Topic Last Completion Date    Lipid Panel 02/28/2022       There is no immunization history on file for this patient.    Objective     Body mass index is 37.12 kg/m².  Wt Readings from Last 3 Encounters:   11/29/22 104.3 kg (230 lb)   08/26/22 109.3 kg (241 lb)   02/28/22 107.5 kg (237 lb)     Ht Readings from Last 3 Encounters:   11/29/22 5' 6" (1.676 m)   08/26/22 5' 6" (1.676 m)   02/28/22 5' 6" (1.676 m)     BP Readings from Last 3 Encounters:   11/29/22 (!) 152/94   08/26/22 122/68   02/28/22 (!) 170/98     Temp Readings from Last 3 Encounters:   11/30/21 98.2 °F (36.8 °C) (Skin)     Pulse Readings from Last 3 Encounters:   11/29/22 76   08/26/22 78   02/28/22 77     Resp Readings from Last 3 Encounters:   11/29/22 16   08/26/22 18   02/28/22 18     PF Readings from Last 3 Encounters:   No data found for PF       Physical Exam  Constitutional:       General: She is not in acute distress.     Appearance: Normal appearance.   HENT:      Head: Normocephalic.      Right Ear: Tympanic membrane and ear canal normal.      Left Ear: Tympanic membrane and ear canal normal.      Nose: Nose normal.      Mouth/Throat:      Mouth: Mucous membranes are moist.      Pharynx: No oropharyngeal exudate.   Eyes:      Extraocular Movements: Extraocular " movements intact.      Pupils: Pupils are equal, round, and reactive to light.   Cardiovascular:      Rate and Rhythm: Normal rate and regular rhythm.      Heart sounds: No murmur heard.  Pulmonary:      Effort: Pulmonary effort is normal.      Breath sounds: Normal breath sounds. No wheezing.   Abdominal:      General: Abdomen is flat. Bowel sounds are normal.      Palpations: Abdomen is soft.      Hernia: No hernia is present.   Musculoskeletal:         General: Normal range of motion.      Cervical back: Normal range of motion and neck supple.      Right lower leg: No edema.      Left lower leg: No edema.   Lymphadenopathy:      Cervical: No cervical adenopathy.   Skin:     General: Skin is warm and dry.      Coloration: Skin is not jaundiced.      Findings: No lesion.   Neurological:      General: No focal deficit present.      Mental Status: She is alert and oriented to person, place, and time.      Cranial Nerves: No cranial nerve deficit.      Gait: Gait normal.   Psychiatric:         Mood and Affect: Mood normal.         Behavior: Behavior normal.         Judgment: Judgment normal.       Assessment and Plan     Problem List Items Addressed This Visit          Cardiac/Vascular    Essential hypertension    Relevant Medications    chlorthalidone (HYGROTEN) 25 MG Tab     Other Visit Diagnoses       Screening for diabetes mellitus    -  Primary    Relevant Orders    Glucose, Fasting    Screening for lipoid disorders        Relevant Orders    Lipid Panel    Encounter for long-term (current) use of other medications        Relevant Orders    POCT Urine Drug Screen Presump (Completed)    Chronic bilateral low back pain without sciatica        Relevant Medications    traMADoL (ULTRAM) 50 mg tablet            Plan:         I have reviewed the medications, allergies, and problem list.     Goal Actions:    What type of visit is the patient here for today?: Healthy You  Does the patient consent to enroll in [facility]  Healthy?: Yes  Is this a Wellness Follow Up?: Yes  What is your overall wellness goal? (select at least one): Lifestyle modifications  Choose 3: Biometric, Lifestyle, Nutrition  Biometric Actions: Attend regularly scheduled office visits  Lifestyle Actions : Take medications as prescribed  Nurtrition Actions: Avoid adding table salt

## 2022-11-29 NOTE — PROGRESS NOTES
Anthony Monge MD        PATIENT NAME: Celine Chandler  : 1970  DATE: 22  MRN: [id number]      Billing Provider: Anthony Monge MD  Level of Service:   Patient PCP Information       Provider PCP Type    Anthony Monge MD General            Reason for Visit / Chief Complaint: Healthy You (Z00.00)       Update PCP  Update Chief Complaint         History of Present Illness / Problem Focused Workflow     Celine Chandler presents to the clinic with Healthy You (Z00.00)     HPI    Review of Systems     Review of Systems     Medical / Social / Family History     Past Medical History:   Diagnosis Date    Arthritis     Hypertension        Past Surgical History:   Procedure Laterality Date     SECTION      CHOLECYSTECTOMY         Social History  Ms.  reports that she has never smoked. She has never used smokeless tobacco.    Family History  Ms.'s family history includes Cancer in her mother; Hypertension in her father and mother.    Medications and Allergies     Medications  Outpatient Medications Marked as Taking for the 22 encounter (Office Visit) with Anthony Monge MD   Medication Sig Dispense Refill    [DISCONTINUED] chlorthalidone (HYGROTEN) 25 MG Tab Take 1 tablet (25 mg total) by mouth once daily. 90 tablet 3    [DISCONTINUED] traMADoL (ULTRAM) 50 mg tablet TAKE 1 TABLET BY MOUTH TWICE A DAY WITH FOOD ..MAY CAUSE DROWSINESS- AVOID ALCOHOL 180 tablet 1       Allergies  Review of patient's allergies indicates:  No Known Allergies    Physical Examination     Vitals:    22 1327   BP: (!) 152/94   Pulse: 76   Resp: 16     Physical Exam     Assessment and Plan (including Health Maintenance)      Problem List  Smart Sets  Document Outside HM   :    Plan:   Screening for diabetes mellitus  -     Glucose, Fasting    Screening for lipoid disorders  -     Lipid Panel; Standing    Encounter for long-term (current) use of other medications  -     POCT Urine Drug Screen Presump    Essential  hypertension  -     chlorthalidone (HYGROTEN) 25 MG Tab; Take 1 tablet (25 mg total) by mouth once daily.  Dispense: 90 tablet; Refill: 3    Chronic bilateral low back pain without sciatica  -     traMADoL (ULTRAM) 50 mg tablet; Take 1 tablet (50 mg total) by mouth 2 (two) times a day. TAKE 1 TABLET BY MOUTH TWICE A DAY WITH FOOD ..MAY CAUSE DROWSINESS- AVOID ALCOHOL  Dispense: 180 tablet; Refill: 1    Other orders  -     hydrocortisone (ANUSOL-HC) 25 mg suppository; Place 1 suppository (25 mg total) rectally 2 (two) times daily. for 10 days  Dispense: 20 suppository; Refill: 2           Health Maintenance Due   Topic Date Due    Hepatitis C Screening  Never done    Cervical Cancer Screening  Never done    COVID-19 Vaccine (1) Never done    HIV Screening  Never done    TETANUS VACCINE  Never done    Mammogram  Never done    Colorectal Cancer Screening  Never done    Shingles Vaccine (1 of 2) Never done    Influenza Vaccine (1) Never done       Problem List Items Addressed This Visit          Cardiac/Vascular    Essential hypertension    Relevant Medications    chlorthalidone (HYGROTEN) 25 MG Tab     Other Visit Diagnoses       Screening for diabetes mellitus    -  Primary    Relevant Orders    Glucose, Fasting    Screening for lipoid disorders        Relevant Orders    Lipid Panel    Encounter for long-term (current) use of other medications        Relevant Orders    POCT Urine Drug Screen Presump (Completed)    Chronic bilateral low back pain without sciatica        Relevant Medications    traMADoL (ULTRAM) 50 mg tablet            Health Maintenance Topics with due status: Not Due       Topic Last Completion Date    Lipid Panel 02/28/2022       Future Appointments   Date Time Provider Department Center   11/3/2023  8:15 AM Anthony Monge MD [facility]        There are no Patient Instructions on file for this visit.  No follow-ups on file.     Signature:  Anthony Monge MD      Date of encounter:  11/29/22

## 2023-01-19 ENCOUNTER — PATIENT MESSAGE (OUTPATIENT)
Dept: FAMILY MEDICINE | Facility: CLINIC | Age: 53
End: 2023-01-19
Payer: COMMERCIAL

## 2023-04-18 ENCOUNTER — PATIENT MESSAGE (OUTPATIENT)
Dept: ADMINISTRATIVE | Facility: HOSPITAL | Age: 53
End: 2023-04-18

## 2023-04-18 ENCOUNTER — PATIENT OUTREACH (OUTPATIENT)
Dept: ADMINISTRATIVE | Facility: HOSPITAL | Age: 53
End: 2023-04-18

## 2023-04-18 NOTE — PROGRESS NOTES
04/18/2023 Care Everywhere updates requested and reviewed.  Immunizations reconciled. Media reports reviewed.  Duplicate HM overrides removed.  Overdue HM topic chart audit and/or requested.     [facility] and [facility] reviewed for Pap Smear and lab testing    Care Team Updated  Message sent via Patient Portal regarding Overdue HM topics     Health Maintenance Due   Topic Date Due    Hepatitis C Screening  Never done    Cervical Cancer Screening  Never done    COVID-19 Vaccine (1) Never done    HIV Screening  Never done    TETANUS VACCINE  Never done    Mammogram  Never done    Hemoglobin A1c (Diabetic Prevention Screening)  Never done    Shingles Vaccine (1 of 2) Never done

## 2023-05-02 ENCOUNTER — OFFICE VISIT (OUTPATIENT)
Dept: FAMILY MEDICINE | Facility: CLINIC | Age: 53
End: 2023-05-02
Payer: COMMERCIAL

## 2023-05-02 VITALS
WEIGHT: 239.38 LBS | DIASTOLIC BLOOD PRESSURE: 84 MMHG | OXYGEN SATURATION: 95 % | TEMPERATURE: 97 F | HEART RATE: 74 BPM | SYSTOLIC BLOOD PRESSURE: 135 MMHG | RESPIRATION RATE: 16 BRPM | HEIGHT: 66 IN | BODY MASS INDEX: 38.47 KG/M2

## 2023-05-02 DIAGNOSIS — Z12.11 COLON CANCER SCREENING: ICD-10-CM

## 2023-05-02 DIAGNOSIS — Z12.11 SCREENING FOR COLON CANCER: Primary | ICD-10-CM

## 2023-05-02 DIAGNOSIS — I10 PRIMARY HYPERTENSION: ICD-10-CM

## 2023-05-02 DIAGNOSIS — I10 ESSENTIAL HYPERTENSION: Primary | ICD-10-CM

## 2023-05-02 PROCEDURE — 3075F SYST BP GE 130 - 139MM HG: CPT | Mod: ,,, | Performed by: FAMILY MEDICINE

## 2023-05-02 PROCEDURE — 80061 LIPID PANEL: CPT | Mod: ,,, | Performed by: CLINICAL MEDICAL LABORATORY

## 2023-05-02 PROCEDURE — 3079F DIAST BP 80-89 MM HG: CPT | Mod: ,,, | Performed by: FAMILY MEDICINE

## 2023-05-02 PROCEDURE — 3008F BODY MASS INDEX DOCD: CPT | Mod: ,,, | Performed by: FAMILY MEDICINE

## 2023-05-02 PROCEDURE — 3075F PR MOST RECENT SYSTOLIC BLOOD PRESS GE 130-139MM HG: ICD-10-PCS | Mod: ,,, | Performed by: FAMILY MEDICINE

## 2023-05-02 PROCEDURE — 1159F MED LIST DOCD IN RCRD: CPT | Mod: ,,, | Performed by: FAMILY MEDICINE

## 2023-05-02 PROCEDURE — 99214 PR OFFICE/OUTPT VISIT, EST, LEVL IV, 30-39 MIN: ICD-10-PCS | Mod: ,,, | Performed by: FAMILY MEDICINE

## 2023-05-02 PROCEDURE — 80053 COMPREHEN METABOLIC PANEL: CPT | Mod: ,,, | Performed by: CLINICAL MEDICAL LABORATORY

## 2023-05-02 PROCEDURE — 3008F PR BODY MASS INDEX (BMI) DOCUMENTED: ICD-10-PCS | Mod: ,,, | Performed by: FAMILY MEDICINE

## 2023-05-02 PROCEDURE — 3079F PR MOST RECENT DIASTOLIC BLOOD PRESSURE 80-89 MM HG: ICD-10-PCS | Mod: ,,, | Performed by: FAMILY MEDICINE

## 2023-05-02 PROCEDURE — 80053 COMPREHENSIVE METABOLIC PANEL: ICD-10-PCS | Mod: ,,, | Performed by: CLINICAL MEDICAL LABORATORY

## 2023-05-02 PROCEDURE — 80061 LIPID PANEL: ICD-10-PCS | Mod: ,,, | Performed by: CLINICAL MEDICAL LABORATORY

## 2023-05-02 PROCEDURE — 99214 OFFICE O/P EST MOD 30 MIN: CPT | Mod: ,,, | Performed by: FAMILY MEDICINE

## 2023-05-02 PROCEDURE — 1159F PR MEDICATION LIST DOCUMENTED IN MEDICAL RECORD: ICD-10-PCS | Mod: ,,, | Performed by: FAMILY MEDICINE

## 2023-05-02 NOTE — PROGRESS NOTES
Subjective     Patient ID: Celine Chandler is a 53 y.o. female.    Chief Complaint: Color Me Healthy (hypertension)    CMH.  Heart pounds at times.    Review of Systems   Constitutional:  Negative for activity change, appetite change, fatigue, fever and unexpected weight change.   HENT:  Negative for nasal congestion, dental problem, ear pain, hearing loss, mouth sores, nosebleeds, rhinorrhea, sore throat, tinnitus, trouble swallowing and voice change.    Eyes:  Negative for pain, discharge and visual disturbance.   Respiratory:  Negative for apnea, choking, chest tightness, shortness of breath and wheezing.    Cardiovascular:  Negative for chest pain, palpitations, leg swelling and claudication.   Gastrointestinal:  Negative for abdominal pain, blood in stool, change in bowel habit, constipation, diarrhea, vomiting and change in bowel habit.   Endocrine: Negative for polydipsia, polyphagia and polyuria.   Genitourinary:  Negative for bladder incontinence, difficulty urinating, dysuria, flank pain, genital sores, hematuria, hot flashes, menstrual irregularity and menstrual problem.   Musculoskeletal:  Positive for arthralgias. Negative for gait problem, joint swelling, neck pain and neck stiffness.   Integumentary:  Negative for rash, wound, mole/lesion, breast mass and breast discharge.   Allergic/Immunologic: Negative for food allergies and immunocompromised state.   Neurological:  Negative for seizures, syncope, weakness, headaches, coordination difficulties, memory loss and coordination difficulties.   Hematological:  Negative for adenopathy. Does not bruise/bleed easily.   Psychiatric/Behavioral:  Negative for agitation, behavioral problems, confusion, decreased concentration, dysphoric mood, hallucinations, self-injury, sleep disturbance and suicidal ideas. The patient is not nervous/anxious.    Breast: Negative for mass    Tobacco Use: Low Risk     Smoking Tobacco Use: Never    Smokeless Tobacco Use: Never     "Passive Exposure: Not on file     Review of patient's allergies indicates:  No Known Allergies  Current Outpatient Medications   Medication Instructions    chlorthalidone (HYGROTEN) 25 mg, Oral, Daily    estradioL (ESTRACE) 1 MG tablet TAKE 1 TABLET BY MOUTH ONCE DAILY on days 1-21, THEN 7 DAYS off    traMADoL (ULTRAM) 50 mg, Oral, 2 times daily, TAKE 1 TABLET BY MOUTH TWICE A DAY WITH FOOD ..MAY CAUSE DROWSINESS- AVOID ALCOHOL    valsartan-hydrochlorothiazide (DIOVAN-HCT) 320-12.5 mg per tablet 1 tablet, Oral, Daily     There are no discontinued medications.    Past Medical History:   Diagnosis Date    Arthritis     Hypertension      Health Maintenance Topics with due status: Not Due       Topic Last Completion Date    Lipid Panel 11/29/2022    Colorectal Cancer Screening Not Due       There is no immunization history on file for this patient.    Objective     Body mass index is 38.64 kg/m².  Wt Readings from Last 3 Encounters:   05/02/23 108.6 kg (239 lb 6.4 oz)   11/29/22 104.3 kg (230 lb)   08/26/22 109.3 kg (241 lb)     Ht Readings from Last 3 Encounters:   05/02/23 5' 6" (1.676 m)   11/29/22 5' 6" (1.676 m)   08/26/22 5' 6" (1.676 m)     BP Readings from Last 3 Encounters:   05/02/23 (!) 142/94   11/29/22 126/72   08/26/22 122/68     Temp Readings from Last 3 Encounters:   05/02/23 97.4 °F (36.3 °C) (Oral)   11/30/21 98.2 °F (36.8 °C) (Skin)     Pulse Readings from Last 3 Encounters:   05/02/23 74   11/29/22 76   08/26/22 78     Resp Readings from Last 3 Encounters:   05/02/23 16   11/29/22 16   08/26/22 18     PF Readings from Last 3 Encounters:   No data found for PF       Physical Exam  Constitutional:       General: She is not in acute distress.     Appearance: Normal appearance.   HENT:      Head: Normocephalic.      Right Ear: Tympanic membrane and ear canal normal.      Left Ear: Tympanic membrane and ear canal normal.      Nose: Nose normal.      Mouth/Throat:      Mouth: Mucous membranes are moist.    "   Pharynx: No oropharyngeal exudate.   Eyes:      Extraocular Movements: Extraocular movements intact.      Pupils: Pupils are equal, round, and reactive to light.   Cardiovascular:      Rate and Rhythm: Normal rate and regular rhythm.      Heart sounds: No murmur heard.  Pulmonary:      Effort: Pulmonary effort is normal.      Breath sounds: Normal breath sounds. No wheezing.   Abdominal:      General: Abdomen is flat. Bowel sounds are normal.      Palpations: Abdomen is soft.      Hernia: No hernia is present.   Musculoskeletal:         General: Normal range of motion.      Cervical back: Normal range of motion and neck supple.      Right lower leg: No edema.      Left lower leg: No edema.   Lymphadenopathy:      Cervical: No cervical adenopathy.   Skin:     General: Skin is warm and dry.      Coloration: Skin is not jaundiced.      Findings: No lesion.   Neurological:      General: No focal deficit present.      Mental Status: She is alert and oriented to person, place, and time.      Cranial Nerves: No cranial nerve deficit.      Gait: Gait normal.   Psychiatric:         Mood and Affect: Mood normal.         Behavior: Behavior normal.         Judgment: Judgment normal.       Assessment and Plan     Problem List Items Addressed This Visit          Cardiac/Vascular    Essential hypertension - Primary    Relevant Orders    Comprehensive Metabolic Panel    Lipid Panel    Microalbumin/Creatinine Ratio, Urine       Plan: The current medical regimen is effective;  continue present plan and medications.      I have reviewed the medications, allergies, and problem list.     Goal Actions:    What type of visit is the patient here for today?: Color Me Healthy  Which Color Me Healthy program is the patient enrolling in?: Blood Pressure (Hypertension)  Is this a Wellness Follow Up?: Yes  What is your overall wellness goal? (select at least one): Lifestyle modifications  Choose 3: Biometric, Lifestyle, Nutrition  Biometric  Actions: Attend regularly scheduled office visits  Lifestyle Actions : Take medications as prescribed  Nurtrition Actions: Avoid adding table salt

## 2023-05-03 ENCOUNTER — PATIENT OUTREACH (OUTPATIENT)
Dept: ADMINISTRATIVE | Facility: HOSPITAL | Age: 53
End: 2023-05-03

## 2023-05-03 DIAGNOSIS — E87.6 HYPOKALEMIA: Primary | ICD-10-CM

## 2023-05-03 LAB
ALBUMIN SERPL BCP-MCNC: 3.5 G/DL (ref 3.5–5)
ALBUMIN/GLOB SERPL: 1.3 {RATIO}
ALP SERPL-CCNC: 51 U/L (ref 46–118)
ALT SERPL W P-5'-P-CCNC: 38 U/L (ref 13–56)
ANION GAP SERPL CALCULATED.3IONS-SCNC: 6 MMOL/L (ref 7–16)
AST SERPL W P-5'-P-CCNC: 29 U/L (ref 15–37)
BILIRUB SERPL-MCNC: 0.5 MG/DL (ref ?–1.2)
BUN SERPL-MCNC: 12 MG/DL (ref 7–18)
BUN/CREAT SERPL: 22 (ref 6–20)
CALCIUM SERPL-MCNC: 9.2 MG/DL (ref 8.5–10.1)
CHLORIDE SERPL-SCNC: 104 MMOL/L (ref 98–107)
CHOLEST SERPL-MCNC: 252 MG/DL (ref 0–200)
CHOLEST/HDLC SERPL: 3.3 {RATIO}
CO2 SERPL-SCNC: 33 MMOL/L (ref 21–32)
CREAT SERPL-MCNC: 0.55 MG/DL (ref 0.55–1.02)
EGFR (NO RACE VARIABLE) (RUSH/TITUS): 108 ML/MIN/1.73M2
GLOBULIN SER-MCNC: 2.8 G/DL (ref 2–4)
GLUCOSE SERPL-MCNC: 89 MG/DL (ref 74–106)
HDLC SERPL-MCNC: 76 MG/DL (ref 40–60)
LDLC SERPL CALC-MCNC: 149 MG/DL
LDLC/HDLC SERPL: 2 {RATIO}
NONHDLC SERPL-MCNC: 176 MG/DL
POTASSIUM SERPL-SCNC: 3.5 MMOL/L (ref 3.5–5.1)
PROT SERPL-MCNC: 6.3 G/DL (ref 6.4–8.2)
SODIUM SERPL-SCNC: 139 MMOL/L (ref 136–145)
TRIGL SERPL-MCNC: 133 MG/DL (ref 35–150)
VLDLC SERPL-MCNC: 27 MG/DL

## 2023-05-03 RX ORDER — POTASSIUM CHLORIDE 750 MG/1
10 CAPSULE, EXTENDED RELEASE ORAL DAILY
Qty: 90 CAPSULE | Refills: 3 | Status: SHIPPED | OUTPATIENT
Start: 2023-05-03 | End: 2024-01-08 | Stop reason: SDUPTHER

## 2023-05-03 NOTE — PROGRESS NOTES
Post visit Population Health review of encounter with date of service  5/2 with [facility].  All required CMH components in encounter.        .  Health Maintenance Due   Topic Date Due    COVID-19 Vaccine (1) Never done    TETANUS VACCINE  Never done    Shingles Vaccine (1 of 2) Never done

## 2023-07-12 ENCOUNTER — PATIENT OUTREACH (OUTPATIENT)
Dept: ADMINISTRATIVE | Facility: HOSPITAL | Age: 53
End: 2023-07-12

## 2023-07-12 ENCOUNTER — OFFICE VISIT (OUTPATIENT)
Dept: FAMILY MEDICINE | Facility: CLINIC | Age: 53
End: 2023-07-12
Payer: COMMERCIAL

## 2023-07-12 VITALS
SYSTOLIC BLOOD PRESSURE: 128 MMHG | RESPIRATION RATE: 16 BRPM | BODY MASS INDEX: 38.51 KG/M2 | HEIGHT: 66 IN | DIASTOLIC BLOOD PRESSURE: 72 MMHG | WEIGHT: 239.63 LBS | OXYGEN SATURATION: 98 % | HEART RATE: 79 BPM | TEMPERATURE: 98 F

## 2023-07-12 DIAGNOSIS — I10 PRIMARY HYPERTENSION: Primary | ICD-10-CM

## 2023-07-12 DIAGNOSIS — Z79.899 ENCOUNTER FOR LONG-TERM (CURRENT) USE OF OTHER MEDICATIONS: ICD-10-CM

## 2023-07-12 DIAGNOSIS — M54.50 CHRONIC BILATERAL LOW BACK PAIN WITHOUT SCIATICA: ICD-10-CM

## 2023-07-12 DIAGNOSIS — G89.29 CHRONIC BILATERAL LOW BACK PAIN WITHOUT SCIATICA: ICD-10-CM

## 2023-07-12 PROCEDURE — 80305 POCT URINE DRUG SCREEN PRESUMP: ICD-10-PCS | Mod: QW,,, | Performed by: FAMILY MEDICINE

## 2023-07-12 PROCEDURE — 3078F PR MOST RECENT DIASTOLIC BLOOD PRESSURE < 80 MM HG: ICD-10-PCS | Mod: ,,, | Performed by: FAMILY MEDICINE

## 2023-07-12 PROCEDURE — 1160F RVW MEDS BY RX/DR IN RCRD: CPT | Mod: ,,, | Performed by: FAMILY MEDICINE

## 2023-07-12 PROCEDURE — 1160F PR REVIEW ALL MEDS BY PRESCRIBER/CLIN PHARMACIST DOCUMENTED: ICD-10-PCS | Mod: ,,, | Performed by: FAMILY MEDICINE

## 2023-07-12 PROCEDURE — 3008F PR BODY MASS INDEX (BMI) DOCUMENTED: ICD-10-PCS | Mod: ,,, | Performed by: FAMILY MEDICINE

## 2023-07-12 PROCEDURE — 1159F PR MEDICATION LIST DOCUMENTED IN MEDICAL RECORD: ICD-10-PCS | Mod: ,,, | Performed by: FAMILY MEDICINE

## 2023-07-12 PROCEDURE — 99214 PR OFFICE/OUTPT VISIT, EST, LEVL IV, 30-39 MIN: ICD-10-PCS | Mod: ,,, | Performed by: FAMILY MEDICINE

## 2023-07-12 PROCEDURE — 3074F SYST BP LT 130 MM HG: CPT | Mod: ,,, | Performed by: FAMILY MEDICINE

## 2023-07-12 PROCEDURE — 1159F MED LIST DOCD IN RCRD: CPT | Mod: ,,, | Performed by: FAMILY MEDICINE

## 2023-07-12 PROCEDURE — 3008F BODY MASS INDEX DOCD: CPT | Mod: ,,, | Performed by: FAMILY MEDICINE

## 2023-07-12 PROCEDURE — 80305 DRUG TEST PRSMV DIR OPT OBS: CPT | Mod: QW,,, | Performed by: FAMILY MEDICINE

## 2023-07-12 PROCEDURE — 3074F PR MOST RECENT SYSTOLIC BLOOD PRESSURE < 130 MM HG: ICD-10-PCS | Mod: ,,, | Performed by: FAMILY MEDICINE

## 2023-07-12 PROCEDURE — 3078F DIAST BP <80 MM HG: CPT | Mod: ,,, | Performed by: FAMILY MEDICINE

## 2023-07-12 PROCEDURE — 99214 OFFICE O/P EST MOD 30 MIN: CPT | Mod: ,,, | Performed by: FAMILY MEDICINE

## 2023-07-12 RX ORDER — TRAMADOL HYDROCHLORIDE 50 MG/1
50 TABLET ORAL 2 TIMES DAILY
Qty: 180 TABLET | Refills: 1 | Status: SHIPPED | OUTPATIENT
Start: 2023-07-12 | End: 2024-01-08 | Stop reason: SDUPTHER

## 2023-07-12 NOTE — PROGRESS NOTES
07/12/2023   --Chart accessed for: Cervical Cancer Screening Audit  --Care Gaps addressed: All HM Topics  Outreach made to patient via Chart Review  Patient stated N/A  Care Everywhere updates requested and reviewed.  Media reports reviewed.  [facility] and [facility] reviewed.  [facility] Content reviewed.  Immunization Database ([employer]/MIXX) reviewed. Vaccinations uploaded: N/A    Health Maintenance Due   Topic Date Due    Hepatitis C Screening  Never done    Cervical Cancer Screening  Never done    COVID-19 Vaccine (1) Never done    HIV Screening  Never done    TETANUS VACCINE  Never done    Mammogram  Never done    Hemoglobin A1c (Diabetic Prevention Screening)  Never done    Shingles Vaccine (1 of 2) Never done

## 2023-07-12 NOTE — PROGRESS NOTES
Anthony Monge MD        PATIENT NAME: Celine Chandler  : 1970  DATE: 23  MRN: [id number]      Billing Provider: Anthony Monge MD  Level of Service:   Patient PCP Information       Provider PCP Type    Anthony Monge MD General            Reason for Visit / Chief Complaint: Chronic Pain (3 month control med check.) and Color Me Healthy (hypertension)       Update PCP  Update Chief Complaint         History of Present Illness / Problem Focused Workflow     Celine Chandler presents to the clinic with Chronic Pain (3 month control med check.) and Color Me Healthy (hypertension)     CMH.  HTN    Chronic Pain    Review of Systems     Review of Systems     Medical / Social / Family History     Past Medical History:   Diagnosis Date    Arthritis     Hypertension        Past Surgical History:   Procedure Laterality Date     SECTION      CHOLECYSTECTOMY         Social History  Ms.  reports that she has never smoked. She has never used smokeless tobacco. She reports that she does not currently use alcohol. She reports that she does not use drugs.    Family History  Ms.'s family history includes Cancer in her mother; Hypertension in her father and mother.    Medications and Allergies     Medications  No outpatient medications have been marked as taking for the 23 encounter (Office Visit) with Anthony Monge MD.       Allergies  Review of patient's allergies indicates:  No Known Allergies    Physical Examination     Vitals:    23 0750   BP: (!) 140/92   Pulse: 79   Resp: 16   Temp: 97.9 °F (36.6 °C)     Physical Exam     Assessment and Plan (including Health Maintenance)      Problem List  Smart Sets  Document Outside HM   :    Plan:     1. Encounter for long-term (current) use of other medications  ***  -     POCT Urine Drug Screen Presump    2. Chronic bilateral low back pain without sciatica  ***          Health Maintenance Due   Topic Date Due    Hepatitis C Screening  Never done    COVID-19  Vaccine (1) Never done    HIV Screening  Never done    TETANUS VACCINE  Never done    Mammogram  Never done    Hemoglobin A1c (Diabetic Prevention Screening)  Never done    Shingles Vaccine (1 of 2) Never done       Problem List Items Addressed This Visit    None  Visit Diagnoses       Encounter for long-term (current) use of other medications    -  Primary    Relevant Orders    POCT Urine Drug Screen Presump (Completed)    Chronic bilateral low back pain without sciatica                Health Maintenance Topics with due status: Not Due       Topic Last Completion Date    Influenza Vaccine 11/29/2022    Lipid Panel 05/02/2023    Colorectal Cancer Screening Not Due       Future Appointments   Date Time Provider Department Center   8/14/2023 12:30 PM [facility] GI ROOM 02 [facility]   11/29/2023  8:15 AM Anthony Monge MD [facility] Primary        There are no Patient Instructions on file for this visit.  No follow-ups on file.     Signature:  Anthony Monge MD      Date of encounter: 7/12/23

## 2023-07-13 ENCOUNTER — PATIENT OUTREACH (OUTPATIENT)
Dept: ADMINISTRATIVE | Facility: HOSPITAL | Age: 53
End: 2023-07-13

## 2023-07-13 NOTE — PROGRESS NOTES
..Post visit Population Health review of encounter with date of service  07/12/2023 with [facility].  Not all required color me components in encounter included.This  Color Me needs a DX of HTN or CHOL as primary Dx instead of Encounter for long term meds for [employer] to pay for this visit.  Message sent to provider and provider's staff. Laly

## 2023-07-17 NOTE — PROGRESS NOTES
Subjective     Patient ID: Celine Chandler is a 53 y.o. female.    Chief Complaint: Chronic Pain (3 month control med check.) and Color Me Healthy (hypertension)    [facility] Routine followup.  No significant interval change      Chronic Pain  Associated symptoms: no abdominal pain, no bladder incontinence, no chest pain, no shortness of breath and no headaches    Review of Systems   Constitutional:  Negative for activity change, appetite change, fatigue, fever and unexpected weight change.   HENT:  Negative for nasal congestion, dental problem, ear pain, hearing loss, mouth sores, nosebleeds, sore throat, tinnitus and voice change.    Eyes:  Negative for pain, discharge and visual disturbance.   Respiratory:  Negative for apnea, choking, chest tightness, shortness of breath and wheezing.    Cardiovascular:  Negative for chest pain, palpitations, leg swelling and claudication.   Gastrointestinal:  Negative for abdominal pain, blood in stool, change in bowel habit and change in bowel habit.   Endocrine: Negative for polydipsia, polyphagia and polyuria.   Genitourinary:  Negative for bladder incontinence, dysuria, flank pain, genital sores, hematuria, hot flashes and menstrual irregularity.   Musculoskeletal:  Negative for arthralgias, gait problem, neck pain and neck stiffness.   Integumentary:  Negative for rash, wound, mole/lesion, breast mass and breast discharge.   Allergic/Immunologic: Negative for food allergies and immunocompromised state.   Neurological:  Negative for seizures, syncope, headaches, coordination difficulties, memory loss and coordination difficulties.   Hematological:  Negative for adenopathy. Does not bruise/bleed easily.   Psychiatric/Behavioral:  Negative for agitation, behavioral problems, confusion, decreased concentration, hallucinations, self-injury, sleep disturbance and suicidal ideas. The patient is not nervous/anxious.    Breast: Negative for mass    Tobacco Use: Low Risk     Smoking Tobacco  "Use: Never    Smokeless Tobacco Use: Never    Passive Exposure: Not on file     Review of patient's allergies indicates:  No Known Allergies  Current Outpatient Medications   Medication Instructions    chlorthalidone (HYGROTEN) 25 mg, Oral, Daily    potassium chloride (MICRO-K) 10 MEQ CpSR 10 mEq, Oral, Daily    traMADoL (ULTRAM) 50 mg, Oral, 2 times daily, TAKE 1 TABLET BY MOUTH TWICE A DAY WITH FOOD ..MAY CAUSE DROWSINESS- AVOID ALCOHOL     Medications Discontinued During This Encounter   Medication Reason    traMADoL (ULTRAM) 50 mg tablet Reorder       Past Medical History:   Diagnosis Date    Arthritis     Hypertension      Health Maintenance Topics with due status: Not Due       Topic Last Completion Date    Influenza Vaccine 11/29/2022    Lipid Panel 05/02/2023    Colorectal Cancer Screening Not Due       There is no immunization history on file for this patient.    Objective     Body mass index is 38.67 kg/m².  Wt Readings from Last 3 Encounters:   07/12/23 108.7 kg (239 lb 9.6 oz)   05/02/23 108.6 kg (239 lb 6.4 oz)   11/29/22 104.3 kg (230 lb)     Ht Readings from Last 3 Encounters:   07/12/23 5' 6" (1.676 m)   05/02/23 5' 6" (1.676 m)   11/29/22 5' 6" (1.676 m)     BP Readings from Last 3 Encounters:   07/12/23 128/72   05/02/23 135/84   11/29/22 126/72     Temp Readings from Last 3 Encounters:   07/12/23 97.9 °F (36.6 °C) (Oral)   05/02/23 97.4 °F (36.3 °C) (Oral)   11/30/21 98.2 °F (36.8 °C) (Skin)     Pulse Readings from Last 3 Encounters:   07/12/23 79   05/02/23 74   11/29/22 76     Resp Readings from Last 3 Encounters:   07/12/23 16   05/02/23 16   11/29/22 16     PF Readings from Last 3 Encounters:   No data found for PF       Physical Exam  Constitutional:       General: She is not in acute distress.     Appearance: Normal appearance.   HENT:      Head: Normocephalic.      Right Ear: Tympanic membrane and ear canal normal.      Left Ear: Tympanic membrane and ear canal normal.      Nose: Nose " normal.      Mouth/Throat:      Mouth: Mucous membranes are moist.      Pharynx: No oropharyngeal exudate.   Eyes:      Extraocular Movements: Extraocular movements intact.      Pupils: Pupils are equal, round, and reactive to light.   Cardiovascular:      Rate and Rhythm: Normal rate and regular rhythm.      Heart sounds: No murmur heard.  Pulmonary:      Effort: Pulmonary effort is normal.      Breath sounds: Normal breath sounds. No wheezing.   Abdominal:      General: Abdomen is flat. Bowel sounds are normal.      Palpations: Abdomen is soft.      Hernia: No hernia is present.   Musculoskeletal:         General: Normal range of motion.      Cervical back: Normal range of motion and neck supple.      Right lower leg: No edema.      Left lower leg: No edema.   Lymphadenopathy:      Cervical: No cervical adenopathy.   Skin:     General: Skin is warm and dry.      Coloration: Skin is not jaundiced.      Findings: No lesion.   Neurological:      General: No focal deficit present.      Mental Status: She is alert and oriented to person, place, and time.      Cranial Nerves: No cranial nerve deficit.      Gait: Gait normal.   Psychiatric:         Mood and Affect: Mood normal.         Behavior: Behavior normal.         Judgment: Judgment normal.       Assessment and Plan     Problem List Items Addressed This Visit    None  Visit Diagnoses       Encounter for long-term (current) use of other medications    -  Primary    Relevant Orders    POCT Urine Drug Screen Presump (Completed)    Chronic bilateral low back pain without sciatica        Relevant Medications    traMADoL (ULTRAM) 50 mg tablet            Plan: The current medical regimen is effective;  continue present plan and medications.      I have reviewed the medications, allergies, and problem list.     Goal Actions:    What type of visit is the patient here for today?: Color Me Healthy  Which Color Me Healthy program is the patient enrolling in?: Blood Pressure  (Hypertension)  Is this a Wellness Follow Up?: Yes  What is your overall wellness goal? (select at least one): Lifestyle modifications  Choose 3: Biometric, Lifestyle, Exercise  Biometric Actions: Attend regularly scheduled office visits  Lifestyle Actions : Take medications as prescribed  Exercise Actions: Recommend physical activity 30 minutes per day 3-5 times/week

## 2023-07-25 ENCOUNTER — PATIENT MESSAGE (OUTPATIENT)
Dept: ADMINISTRATIVE | Facility: HOSPITAL | Age: 53
End: 2023-07-25

## 2023-08-14 ENCOUNTER — HOSPITAL ENCOUNTER (OUTPATIENT)
Dept: GASTROENTEROLOGY | Facility: HOSPITAL | Age: 53
Discharge: HOME | End: 2023-08-14
Attending: INTERNAL MEDICINE
Payer: COMMERCIAL

## 2023-08-14 ENCOUNTER — ANESTHESIA (OUTPATIENT)
Dept: GASTROENTEROLOGY | Facility: HOSPITAL | Age: 53
End: 2023-08-14
Payer: COMMERCIAL

## 2023-08-14 ENCOUNTER — ANESTHESIA EVENT (OUTPATIENT)
Dept: GASTROENTEROLOGY | Facility: HOSPITAL | Age: 53
End: 2023-08-14
Payer: COMMERCIAL

## 2023-08-14 VITALS
HEART RATE: 77 BPM | BODY MASS INDEX: 37.77 KG/M2 | RESPIRATION RATE: 12 BRPM | OXYGEN SATURATION: 97 % | TEMPERATURE: 98 F | DIASTOLIC BLOOD PRESSURE: 75 MMHG | SYSTOLIC BLOOD PRESSURE: 112 MMHG | WEIGHT: 234 LBS

## 2023-08-14 DIAGNOSIS — Z12.11 SCREENING FOR COLON CANCER: ICD-10-CM

## 2023-08-14 DIAGNOSIS — Z80.0 FAMILY HISTORY OF COLON CANCER: Primary | ICD-10-CM

## 2023-08-14 DIAGNOSIS — K57.30 DIVERTICULOSIS OF COLON: ICD-10-CM

## 2023-08-14 PROCEDURE — D9220A PRA ANESTHESIA: Mod: ,,, | Performed by: NURSE ANESTHETIST, CERTIFIED REGISTERED

## 2023-08-14 PROCEDURE — 27000284 HC CANNULA NASAL: Performed by: NURSE ANESTHETIST, CERTIFIED REGISTERED

## 2023-08-14 PROCEDURE — 37000009 HC ANESTHESIA EA ADD 15 MINS

## 2023-08-14 PROCEDURE — D9220A PRA ANESTHESIA: ICD-10-PCS | Mod: ,,, | Performed by: NURSE ANESTHETIST, CERTIFIED REGISTERED

## 2023-08-14 PROCEDURE — 37000008 HC ANESTHESIA 1ST 15 MINUTES

## 2023-08-14 PROCEDURE — G0105 COLORECTAL SCRN; HI RISK IND: HCPCS | Mod: ,,, | Performed by: INTERNAL MEDICINE

## 2023-08-14 PROCEDURE — 63600175 PHARM REV CODE 636 W HCPCS: Performed by: NURSE ANESTHETIST, CERTIFIED REGISTERED

## 2023-08-14 PROCEDURE — 25000003 PHARM REV CODE 250: Performed by: NURSE ANESTHETIST, CERTIFIED REGISTERED

## 2023-08-14 PROCEDURE — G0105 COLORECTAL SCRN; HI RISK IND: ICD-10-PCS | Mod: ,,, | Performed by: INTERNAL MEDICINE

## 2023-08-14 PROCEDURE — G0105 COLORECTAL SCRN; HI RISK IND: HCPCS | Performed by: INTERNAL MEDICINE

## 2023-08-14 RX ORDER — SODIUM CHLORIDE 0.9 % (FLUSH) 0.9 %
10 SYRINGE (ML) INJECTION
Status: DISCONTINUED | OUTPATIENT
Start: 2023-08-14 | End: 2023-08-15 | Stop reason: HOSPADM

## 2023-08-14 RX ORDER — LIDOCAINE HYDROCHLORIDE 20 MG/ML
INJECTION, SOLUTION EPIDURAL; INFILTRATION; INTRACAUDAL; PERINEURAL
Status: DISCONTINUED | OUTPATIENT
Start: 2023-08-14 | End: 2023-08-14

## 2023-08-14 RX ORDER — PROPOFOL 10 MG/ML
VIAL (ML) INTRAVENOUS
Status: DISCONTINUED | OUTPATIENT
Start: 2023-08-14 | End: 2023-08-14

## 2023-08-14 RX ADMIN — PROPOFOL 30 MG: 10 INJECTION, EMULSION INTRAVENOUS at 03:08

## 2023-08-14 RX ADMIN — SODIUM CHLORIDE: 9 INJECTION, SOLUTION INTRAVENOUS at 03:08

## 2023-08-14 RX ADMIN — PROPOFOL 20 MG: 10 INJECTION, EMULSION INTRAVENOUS at 03:08

## 2023-08-14 RX ADMIN — PROPOFOL 100 MG: 10 INJECTION, EMULSION INTRAVENOUS at 03:08

## 2023-08-14 RX ADMIN — LIDOCAINE HYDROCHLORIDE 60 MG: 20 INJECTION, SOLUTION INTRAVENOUS at 03:08

## 2023-08-14 NOTE — TRANSFER OF CARE
Anesthesia Transfer of Care Note    Patient: Celine Chandler    Procedure(s) Performed: Colonoscopy    Patient location: GI    Anesthesia Type: general    Transport from OR: Transported from OR on room air with adequate spontaneous ventilation. Continuous ECG monitoring in transport. Continuous SpO2 monitoring in transport    Post pain: adequate analgesia    Post assessment: no apparent anesthetic complications and tolerated procedure well    Post vital signs: stable    Level of consciousness: awake, oriented and alert    Nausea/Vomiting: no nausea/vomiting    Complications: none    Transfer of care protocol was followed      Last vitals:   Visit Vitals  BP (!) 97/55 (BP Location: Right arm, Patient Position: Lying)   Pulse 78   Temp 36.5 °C (97.7 °F) (Skin)   Resp 19   Wt 106.1 kg (234 lb)   SpO2 97%   Breastfeeding No   BMI 37.77 kg/m²

## 2023-08-14 NOTE — ANESTHESIA PREPROCEDURE EVALUATION
08/14/2023  Celine Chandler is a 53 y.o., female.      Pre-op Assessment    I have reviewed the Patient Summary Reports.     I have reviewed the Nursing Notes. I have reviewed the NPO Status.   I have reviewed the Medications.     Review of Systems  Anesthesia Hx:  No problems with previous Anesthesia    Social:  No Alcohol Use, Non-Smoker    Cardiovascular:   Hypertension    Musculoskeletal:   Arthritis         Physical Exam  General: Well nourished, Cooperative, Alert and Oriented    Airway:  Mallampati: II   TM Distance: Normal  Neck ROM: Normal ROM    Dental:  Intact    Chest/Lungs:  Normal Respiratory Rate    Heart:  Rate: Normal  Rhythm: Regular Rhythm        Anesthesia Plan  Type of Anesthesia, risks & benefits discussed:    Anesthesia Type: Gen Natural Airway, MAC  Intra-op Monitoring Plan: Standard ASA Monitors  Post Op Pain Control Plan: multimodal analgesia  Induction:  IV  Informed Consent: Informed consent signed with the Patient and all parties understand the risks and agree with anesthesia plan.  All questions answered. Patient consented to blood products? Yes  ASA Score: 2  Day of Surgery Review of History & Physical: H&P Update referred to the surgeon/provider.I have interviewed and examined the patient. I have reviewed the patient's H&P dated: There are no significant changes.     Ready For Surgery From Anesthesia Perspective.     .

## 2023-08-14 NOTE — ANESTHESIA POSTPROCEDURE EVALUATION
Anesthesia Post Evaluation    Patient: Celine Chandler    Procedure(s) Performed: Colonoscopy    Final Anesthesia Type: general      Patient location during evaluation: GI PACU  Patient participation: Yes- Able to Participate  Level of consciousness: awake and alert  Post-procedure vital signs: reviewed and stable  Pain management: adequate  Airway patency: patent  ALISON mitigation strategies: Multimodal analgesia  PONV status at discharge: No PONV  Anesthetic complications: no      Cardiovascular status: stable  Respiratory status: unassisted  Hydration status: euvolemic  Follow-up not needed.          Vitals Value Taken Time   /75 08/14/23 1610   Temp 97.7F 08/14/23 1627   Pulse 81 08/14/23 1610   Resp 15 08/14/23 1610   SpO2 97 % 08/14/23 1610   Vitals shown include unvalidated device data.      Event Time   Out of Recovery 16:27:06         Pain/Doug Score: Doug Score: 10 (8/14/2023  3:45 PM)

## 2023-08-14 NOTE — DISCHARGE INSTRUCTIONS
Procedure Date  8/14/23     Impression  Overall Impression:   Diverticulosis in the descending colon and sigmoid colon  No colon polyps were seen. Diverticula were noted in the sigmoid and descending colon.     Recommendation    Repeat colonoscopy in 5 years      High fiber diet  Discharge:  Disp: DC to home. Resume diet. No driving x 24 hours. F/U with PCP as scheduled.  Dx: family history of colon cancer; colon diverticulosis     Indication  Screening for colon cancer; family history of colon cancer     NO DRIVING, OPERATING EQUIPMENT, OR SIGNING LEGAL DOCUMENTS FOR 24 HOURS.

## 2023-08-14 NOTE — H&P
[facility] - Endoscopy  Gastroenterology  H&P    Patient Name: Celine Chandler  MRN: [id number]  Admission Date: 2023  Code Status: No Order    Attending Provider: Sampson Bennett MD   Primary Care Physician: Anthony Monge MD  Principal Problem:<principal problem not specified>    Subjective:     History of Present Illness: Pt is referred by  for screening colonoscopy. No prior colonoscopy report is available.    Past Medical History:   Diagnosis Date    Arthritis     Hypertension        Past Surgical History:   Procedure Laterality Date     SECTION      CHOLECYSTECTOMY         Review of patient's allergies indicates:  No Known Allergies  Family History       Problem Relation (Age of Onset)    Cancer Mother    Hypertension Mother, Father          Tobacco Use    Smoking status: Never    Smokeless tobacco: Never   Substance and Sexual Activity    Alcohol use: Not Currently    Drug use: Never    Sexual activity: Yes     Review of Systems   Respiratory: Negative.     Cardiovascular: Negative.    Gastrointestinal: Negative.      Objective:     Vital Signs (Most Recent):  Pulse: 91 (23 142)  Resp: 16 (23 142)  BP: (!) 142/93 (23 142)  SpO2: 96 % (23 142) Vital Signs (24h Range):  Pulse:  [91] 91  Resp:  [16] 16  SpO2:  [96 %] 96 %  BP: (142)/(93) 142/93     Weight: 106.1 kg (234 lb) (23 142)  Body mass index is 37.77 kg/m².    No intake or output data in the 24 hours ending 23 1525    Lines/Drains/Airways       Peripheral Intravenous Line  Duration                  Peripheral IV - Single Lumen 23 1422 22 G Anterior;Left Wrist <1 day                    Physical Exam  Vitals reviewed.   Constitutional:       General: She is not in acute distress.     Appearance: Normal appearance. She is well-developed. She is not ill-appearing.   HENT:      Head: Normocephalic and atraumatic.      Nose: Nose normal.   Eyes:      Pupils: Pupils are equal, round, and reactive  "to light.   Cardiovascular:      Rate and Rhythm: Normal rate and regular rhythm.   Pulmonary:      Effort: Pulmonary effort is normal.      Breath sounds: Normal breath sounds. No wheezing.   Abdominal:      General: Abdomen is flat. Bowel sounds are normal. There is no distension.      Palpations: Abdomen is soft.      Tenderness: There is no abdominal tenderness. There is no guarding.   Skin:     General: Skin is warm and dry.      Coloration: Skin is not jaundiced.   Neurological:      Mental Status: She is alert.   Psychiatric:         Attention and Perception: Attention normal.         Mood and Affect: Affect normal.         Speech: Speech normal.         Behavior: Behavior is cooperative.      Comments: Pt was calm while speaking.         Significant Labs:  CBC: No results for input(s): "WBC", "HGB", "HCT", "PLT" in the last 48 hours.  CMP: No results for input(s): "GLU", "CALCIUM", "ALBUMIN", "PROT", "NA", "K", "CO2", "CL", "BUN", "CREATININE", "ALKPHOS", "ALT", "AST", "BILITOT" in the last 48 hours.    Significant Imaging:  Imaging results within the past 24 hours have been reviewed.    Assessment/Plan:     There are no hospital problems to display for this patient.        Imp: screening for colon neoplasm; family history of colon cancer in father.  Plan: colonoscopy    Sampson Bennett MD  Gastroenterology  [facility] - Endoscopy  "

## 2023-12-27 DIAGNOSIS — I10 ESSENTIAL HYPERTENSION: ICD-10-CM

## 2023-12-27 RX ORDER — CHLORTHALIDONE 25 MG/1
25 TABLET ORAL
Qty: 90 TABLET | Refills: 0 | Status: SHIPPED | OUTPATIENT
Start: 2023-12-27 | End: 2024-01-08 | Stop reason: SDUPTHER

## 2024-01-08 ENCOUNTER — OFFICE VISIT (OUTPATIENT)
Dept: FAMILY MEDICINE | Facility: CLINIC | Age: 54
End: 2024-01-08
Payer: COMMERCIAL

## 2024-01-08 VITALS
WEIGHT: 237 LBS | HEIGHT: 66 IN | DIASTOLIC BLOOD PRESSURE: 99 MMHG | BODY MASS INDEX: 38.09 KG/M2 | RESPIRATION RATE: 20 BRPM | SYSTOLIC BLOOD PRESSURE: 169 MMHG | HEART RATE: 82 BPM | OXYGEN SATURATION: 95 %

## 2024-01-08 DIAGNOSIS — E87.6 HYPOKALEMIA: ICD-10-CM

## 2024-01-08 DIAGNOSIS — I10 ESSENTIAL HYPERTENSION: ICD-10-CM

## 2024-01-08 DIAGNOSIS — E53.8 B12 DEFICIENCY: ICD-10-CM

## 2024-01-08 DIAGNOSIS — Z79.899 ENCOUNTER FOR LONG-TERM (CURRENT) USE OF OTHER MEDICATIONS: ICD-10-CM

## 2024-01-08 DIAGNOSIS — G89.29 CHRONIC BILATERAL LOW BACK PAIN WITHOUT SCIATICA: Primary | ICD-10-CM

## 2024-01-08 DIAGNOSIS — M54.50 CHRONIC BILATERAL LOW BACK PAIN WITHOUT SCIATICA: Primary | ICD-10-CM

## 2024-01-08 DIAGNOSIS — E55.9 VITAMIN D DEFICIENCY: ICD-10-CM

## 2024-01-08 LAB
25(OH)D3 SERPL-MCNC: 54.2 NG/ML
CTP QC/QA: YES
POC (AMP) AMPHETAMINE: NEGATIVE
POC (BAR) BARBITURATES: NEGATIVE
POC (BUP) BUPRENORPHINE: NEGATIVE
POC (BZO) BENZODIAZEPINES: NEGATIVE
POC (COC) COCAINE: NEGATIVE
POC (MDMA) METHYLENEDIOXYMETHAMPHETAMINE 3,4: NEGATIVE
POC (MET) METHAMPHETAMINE: NEGATIVE
POC (MOP) OPIATES: NEGATIVE
POC (MTD) METHADONE: NEGATIVE
POC (OXY) OXYCODONE: NEGATIVE
POC (PCP) PHENCYCLIDINE: NEGATIVE
POC (TCA) TRICYCLIC ANTIDEPRESSANTS: NORMAL
POC TEMPERATURE (URINE): 92
POC THC: NEGATIVE
VIT B12 SERPL-MCNC: 802 PG/ML (ref 193–986)

## 2024-01-08 PROCEDURE — 1159F MED LIST DOCD IN RCRD: CPT | Mod: ,,, | Performed by: FAMILY MEDICINE

## 2024-01-08 PROCEDURE — 99214 OFFICE O/P EST MOD 30 MIN: CPT | Mod: ,,, | Performed by: FAMILY MEDICINE

## 2024-01-08 PROCEDURE — 3080F DIAST BP >= 90 MM HG: CPT | Mod: ,,, | Performed by: FAMILY MEDICINE

## 2024-01-08 PROCEDURE — 82607 VITAMIN B-12: CPT | Mod: ,,, | Performed by: CLINICAL MEDICAL LABORATORY

## 2024-01-08 PROCEDURE — 80305 DRUG TEST PRSMV DIR OPT OBS: CPT | Mod: QW,,, | Performed by: FAMILY MEDICINE

## 2024-01-08 PROCEDURE — 1160F RVW MEDS BY RX/DR IN RCRD: CPT | Mod: ,,, | Performed by: FAMILY MEDICINE

## 2024-01-08 PROCEDURE — 3077F SYST BP >= 140 MM HG: CPT | Mod: ,,, | Performed by: FAMILY MEDICINE

## 2024-01-08 PROCEDURE — 82306 VITAMIN D 25 HYDROXY: CPT | Mod: ,,, | Performed by: CLINICAL MEDICAL LABORATORY

## 2024-01-08 PROCEDURE — 3008F BODY MASS INDEX DOCD: CPT | Mod: ,,, | Performed by: FAMILY MEDICINE

## 2024-01-08 RX ORDER — CHLORTHALIDONE 25 MG/1
25 TABLET ORAL DAILY
Qty: 90 TABLET | Refills: 3 | Status: SHIPPED | OUTPATIENT
Start: 2024-01-08

## 2024-01-08 RX ORDER — POTASSIUM CHLORIDE 750 MG/1
10 CAPSULE, EXTENDED RELEASE ORAL DAILY
Qty: 90 CAPSULE | Refills: 3 | Status: SHIPPED | OUTPATIENT
Start: 2024-01-08

## 2024-01-08 RX ORDER — TRAMADOL HYDROCHLORIDE 50 MG/1
50 TABLET ORAL 2 TIMES DAILY
Qty: 180 TABLET | Refills: 1 | Status: SHIPPED | OUTPATIENT
Start: 2024-01-08

## 2024-01-08 NOTE — PROGRESS NOTES
Anthony Monge MD        PATIENT NAME: Celine Chandler  : 1970  DATE: 24  MRN: [id number]      Billing Provider: Anthony Monge MD  Level of Service:   Patient PCP Information       Provider PCP Type    Anthony Monge MD General            Reason for Visit / Chief Complaint: Pain (Check up for refill)       Update PCP  Update Chief Complaint         History of Present Illness / Problem Focused Workflow     Celine Chandler presents to the clinic with Pain (Check up for refill)     Routine followup.  No significant interval change.      Pain  Associated symptoms include arthralgias. Pertinent negatives include no abdominal pain, chest pain, congestion, coughing, fever, headaches, nausea, neck pain, rash, sore throat or weakness (global weakness).     Review of Systems     Review of Systems   Constitutional:  Negative for activity change, appetite change, fever and unexpected weight change.   HENT:  Negative for congestion, rhinorrhea, sinus pressure, sinus pain, sore throat and trouble swallowing.    Eyes:  Negative for photophobia, pain, discharge and visual disturbance.   Respiratory:  Negative for cough, chest tightness, wheezing and stridor.    Cardiovascular:  Negative for chest pain, palpitations and leg swelling.   Gastrointestinal:  Negative for abdominal pain, blood in stool, constipation, diarrhea and nausea.   Endocrine: Negative for polydipsia, polyphagia and polyuria.   Genitourinary:  Negative for difficulty urinating, flank pain and hematuria.   Musculoskeletal:  Positive for arthralgias and back pain. Negative for neck pain.   Skin:  Negative for rash.   Allergic/Immunologic: Negative for food allergies.   Neurological:  Negative for dizziness, tremors, seizures, syncope, weakness (global weakness) and headaches.   Psychiatric/Behavioral:  Negative for behavioral problems, confusion, decreased concentration, dysphoric mood and hallucinations. The patient is not nervous/anxious.       Medical /  Social / Family History     Past Medical History:   Diagnosis Date    Arthritis     Hypertension        Past Surgical History:   Procedure Laterality Date     SECTION      CHOLECYSTECTOMY         Social History  Ms.  reports that she has never smoked. She has never used smokeless tobacco. She reports that she does not currently use alcohol. She reports that she does not use drugs.    Family History  Ms.'s family history includes Cancer in her mother; Hypertension in her father and mother.    Medications and Allergies     Medications  No outpatient medications have been marked as taking for the 24 encounter (Office Visit) with Anthony Monge MD.       Allergies  Review of patient's allergies indicates:  No Known Allergies    Physical Examination     Vitals:    24 0906   BP: (!) 169/99   Pulse: 82   Resp: 20     Physical Exam  Constitutional:       General: She is not in acute distress.     Appearance: Normal appearance.   HENT:      Head: Normocephalic.      Right Ear: Tympanic membrane and ear canal normal.      Left Ear: Tympanic membrane and ear canal normal.      Nose: Nose normal.      Mouth/Throat:      Mouth: Mucous membranes are moist.      Pharynx: No oropharyngeal exudate.   Eyes:      Extraocular Movements: Extraocular movements intact.      Pupils: Pupils are equal, round, and reactive to light.   Cardiovascular:      Rate and Rhythm: Normal rate and regular rhythm.      Heart sounds: No murmur heard.  Pulmonary:      Effort: Pulmonary effort is normal.      Breath sounds: Normal breath sounds. No wheezing.   Abdominal:      General: Abdomen is flat. Bowel sounds are normal.      Palpations: Abdomen is soft.      Hernia: No hernia is present.   Musculoskeletal:         General: Normal range of motion.      Cervical back: Normal range of motion and neck supple.      Right lower leg: No edema.      Left lower leg: No edema.   Lymphadenopathy:      Cervical: No cervical adenopathy.    Skin:     General: Skin is warm and dry.      Coloration: Skin is not jaundiced.      Findings: No lesion.   Neurological:      General: No focal deficit present.      Mental Status: She is alert and oriented to person, place, and time.      Cranial Nerves: No cranial nerve deficit.      Gait: Gait normal.   Psychiatric:         Mood and Affect: Mood normal.         Behavior: Behavior normal.         Judgment: Judgment normal.        Assessment and Plan (including Health Maintenance)      Problem List  Smart Sets  Document Outside HM   :    Plan:     1. Encounter for long-term (current) use of other medications  The current medical regimen is effective;  continue present plan and medications.  -     POCT Urine Drug Screen Presump    2. Chronic bilateral low back pain without sciatica  The current medical regimen is effective;  continue present plan and medications.  -     traMADoL (ULTRAM) 50 mg tablet; Take 1 tablet (50 mg total) by mouth 2 (two) times a day. TAKE 1 TABLET BY MOUTH TWICE A DAY WITH FOOD ..MAY CAUSE DROWSINESS- AVOID ALCOHOL  Dispense: 180 tablet; Refill: 1    3. Vitamin D deficiency  The current medical regimen is effective;  continue present plan and medications.  -     Vitamin D    4. B12 deficiency  The current medical regimen is effective;  continue present plan and medications.  -     Vitamin B12    5. Essential hypertension  The current medical regimen is effective;  continue present plan and medications.  -     chlorthalidone (HYGROTEN) 25 MG Tab; Take 1 tablet (25 mg total) by mouth once daily.  Dispense: 90 tablet; Refill: 3    6. Hypokalemia  The current medical regimen is effective;  continue present plan and medications.  -     potassium chloride (MICRO-K) 10 MEQ CpSR; Take 1 capsule (10 mEq total) by mouth once daily.  Dispense: 90 capsule; Refill: 3          Health Maintenance Due   Topic Date Due    Hepatitis C Screening  Never done    Cervical Cancer Screening  Never done     COVID-19 Vaccine (1) Never done    HIV Screening  Never done    TETANUS VACCINE  Never done    Mammogram  Never done    Hemoglobin A1c (Diabetic Prevention Screening)  Never done    Shingles Vaccine (1 of 2) Never done    Influenza Vaccine (1) 09/01/2023       1. Encounter for long-term (current) use of other medications  -     POCT Urine Drug Screen Presump    2. Chronic bilateral low back pain without sciatica  -     traMADoL (ULTRAM) 50 mg tablet; Take 1 tablet (50 mg total) by mouth 2 (two) times a day. TAKE 1 TABLET BY MOUTH TWICE A DAY WITH FOOD ..MAY CAUSE DROWSINESS- AVOID ALCOHOL  Dispense: 180 tablet; Refill: 1    3. Vitamin D deficiency  -     Vitamin D    4. B12 deficiency  -     Vitamin B12    5. Essential hypertension  -     chlorthalidone (HYGROTEN) 25 MG Tab; Take 1 tablet (25 mg total) by mouth once daily.  Dispense: 90 tablet; Refill: 3    6. Hypokalemia  -     potassium chloride (MICRO-K) 10 MEQ CpSR; Take 1 capsule (10 mEq total) by mouth once daily.  Dispense: 90 capsule; Refill: 3         Health Maintenance Topics with due status: Not Due       Topic Last Completion Date    Lipid Panel 05/02/2023    Colorectal Cancer Screening 08/14/2023       No future appointments.     There are no Patient Instructions on file for this visit.  No follow-ups on file.     Signature:  Anthony Monge MD      Date of encounter: 1/8/24

## 2024-06-25 ENCOUNTER — OFFICE VISIT (OUTPATIENT)
Dept: FAMILY MEDICINE | Facility: CLINIC | Age: 54
End: 2024-06-25
Payer: COMMERCIAL

## 2024-06-25 VITALS
BODY MASS INDEX: 36.96 KG/M2 | SYSTOLIC BLOOD PRESSURE: 136 MMHG | HEIGHT: 66 IN | DIASTOLIC BLOOD PRESSURE: 85 MMHG | OXYGEN SATURATION: 95 % | WEIGHT: 230 LBS | RESPIRATION RATE: 18 BRPM | HEART RATE: 100 BPM

## 2024-06-25 DIAGNOSIS — E66.09 CLASS 2 OBESITY DUE TO EXCESS CALORIES WITH BODY MASS INDEX (BMI) OF 37.0 TO 37.9 IN ADULT, UNSPECIFIED WHETHER SERIOUS COMORBIDITY PRESENT: ICD-10-CM

## 2024-06-25 DIAGNOSIS — G89.29 CHRONIC BILATERAL LOW BACK PAIN WITHOUT SCIATICA: Primary | ICD-10-CM

## 2024-06-25 DIAGNOSIS — Z79.899 ENCOUNTER FOR LONG-TERM (CURRENT) USE OF OTHER MEDICATIONS: ICD-10-CM

## 2024-06-25 DIAGNOSIS — M54.50 CHRONIC BILATERAL LOW BACK PAIN WITHOUT SCIATICA: Primary | ICD-10-CM

## 2024-06-25 PROCEDURE — 80305 DRUG TEST PRSMV DIR OPT OBS: CPT | Mod: QW,,, | Performed by: FAMILY MEDICINE

## 2024-06-25 PROCEDURE — 3008F BODY MASS INDEX DOCD: CPT | Mod: ,,, | Performed by: FAMILY MEDICINE

## 2024-06-25 PROCEDURE — 3075F SYST BP GE 130 - 139MM HG: CPT | Mod: ,,, | Performed by: FAMILY MEDICINE

## 2024-06-25 PROCEDURE — 1159F MED LIST DOCD IN RCRD: CPT | Mod: ,,, | Performed by: FAMILY MEDICINE

## 2024-06-25 PROCEDURE — 3079F DIAST BP 80-89 MM HG: CPT | Mod: ,,, | Performed by: FAMILY MEDICINE

## 2024-06-25 PROCEDURE — 1160F RVW MEDS BY RX/DR IN RCRD: CPT | Mod: ,,, | Performed by: FAMILY MEDICINE

## 2024-06-25 PROCEDURE — 99213 OFFICE O/P EST LOW 20 MIN: CPT | Mod: ,,, | Performed by: FAMILY MEDICINE

## 2024-06-25 RX ORDER — TRAMADOL HYDROCHLORIDE 50 MG/1
50 TABLET ORAL 2 TIMES DAILY
Qty: 180 TABLET | Refills: 1 | Status: SHIPPED | OUTPATIENT
Start: 2024-06-25 | End: 2024-06-28

## 2024-06-25 RX ORDER — TIRZEPATIDE 2.5 MG/.5ML
2.5 INJECTION, SOLUTION SUBCUTANEOUS
Qty: 4 PEN | Refills: 2 | Status: SHIPPED | OUTPATIENT
Start: 2024-06-25

## 2024-06-25 NOTE — PROGRESS NOTES
Anthony Monge MD        PATIENT NAME: Celine Chandler  : 1970  DATE: 24  MRN: [id number]      Billing Provider: Anthony Monge MD  Level of Service:   Patient PCP Information       Provider PCP Type    Anthony Monge MD General            Reason for Visit / Chief Complaint: Back Pain (Check up for refill) and Obesity (Discuss weight loss meds)       Update PCP  Update Chief Complaint         History of Present Illness / Problem Focused Workflow     Celine Chandler presents to the clinic with Back Pain (Check up for refill) and Obesity (Discuss weight loss meds)     Trying to lose weight.  Has lost 20 on her own.      Back Pain  Pertinent negatives include no abdominal pain, chest pain, fever, headaches or weakness (global weakness).       Review of Systems     Review of Systems   Constitutional:  Negative for activity change, appetite change, fever and unexpected weight change.   HENT:  Negative for congestion, rhinorrhea, sinus pressure, sinus pain, sore throat and trouble swallowing.    Eyes:  Negative for photophobia, pain, discharge and visual disturbance.   Respiratory:  Negative for cough, chest tightness, wheezing and stridor.    Cardiovascular:  Negative for chest pain, palpitations and leg swelling.   Gastrointestinal:  Negative for abdominal pain, blood in stool, constipation, diarrhea and nausea.   Endocrine: Negative for polydipsia, polyphagia and polyuria.   Genitourinary:  Negative for difficulty urinating, flank pain and hematuria.   Musculoskeletal:  Positive for back pain. Negative for arthralgias and neck pain.   Skin:  Negative for rash.   Allergic/Immunologic: Negative for food allergies.   Neurological:  Negative for dizziness, tremors, seizures, syncope, weakness (global weakness) and headaches.   Psychiatric/Behavioral:  Negative for behavioral problems, confusion, decreased concentration, dysphoric mood and hallucinations. The patient is not nervous/anxious.         Medical / Social /  Family History     Past Medical History:   Diagnosis Date    Arthritis     Hypertension        Past Surgical History:   Procedure Laterality Date     SECTION      CHOLECYSTECTOMY         Social History  Ms.  reports that she has never smoked. She has never used smokeless tobacco. She reports that she does not currently use alcohol. She reports that she does not use drugs.    Family History  Ms.'s family history includes Cancer in her mother; Hypertension in her father and mother.    Medications and Allergies     Medications  No outpatient medications have been marked as taking for the 24 encounter (Office Visit) with Anthony Monge MD.       Allergies  Review of patient's allergies indicates:  No Known Allergies    Physical Examination     Vitals:    24 1516   BP: 136/85   Pulse: 100   Resp: 18     Physical Exam  Constitutional:       General: She is not in acute distress.     Appearance: Normal appearance.   HENT:      Head: Normocephalic.      Right Ear: Tympanic membrane and ear canal normal.      Left Ear: Tympanic membrane and ear canal normal.      Nose: Nose normal.      Mouth/Throat:      Mouth: Mucous membranes are moist.      Pharynx: No oropharyngeal exudate.   Eyes:      Extraocular Movements: Extraocular movements intact.      Pupils: Pupils are equal, round, and reactive to light.   Cardiovascular:      Rate and Rhythm: Normal rate and regular rhythm.      Heart sounds: No murmur heard.  Pulmonary:      Effort: Pulmonary effort is normal.      Breath sounds: Normal breath sounds. No wheezing.   Abdominal:      General: Abdomen is flat. Bowel sounds are normal.      Palpations: Abdomen is soft.      Hernia: No hernia is present.   Musculoskeletal:         General: Normal range of motion.      Cervical back: Normal range of motion and neck supple.      Right lower leg: No edema.      Left lower leg: No edema.   Lymphadenopathy:      Cervical: No cervical adenopathy.   Skin:      General: Skin is warm and dry.      Coloration: Skin is not jaundiced.      Findings: No lesion.   Neurological:      General: No focal deficit present.      Mental Status: She is alert and oriented to person, place, and time.      Cranial Nerves: No cranial nerve deficit.      Gait: Gait normal.   Psychiatric:         Mood and Affect: Mood normal.         Behavior: Behavior normal.         Judgment: Judgment normal.          Assessment and Plan (including Health Maintenance)      Problem List  Smart Sets  Document Outside HM   :    Plan:     1. Encounter for long-term (current) use of other medications  The current medical regimen is effective;  continue present plan and medications.  -     POCT Urine Drug Screen Presump    2. Chronic bilateral low back pain without sciatica  The current medical regimen is effective;  continue present plan and medications.          Health Maintenance Due   Topic Date Due    Hepatitis C Screening  Never done    Cervical Cancer Screening  Never done    HIV Screening  Never done    TETANUS VACCINE  Never done    Mammogram  Never done    Hemoglobin A1c (Diabetic Prevention Screening)  Never done    Shingles Vaccine (1 of 2) Never done    COVID-19 Vaccine (1 - 2023-24 season) Never done       1. Encounter for long-term (current) use of other medications  -     POCT Urine Drug Screen Presump    2. Chronic bilateral low back pain without sciatica         Health Maintenance Topics with due status: Not Due       Topic Last Completion Date    Influenza Vaccine 11/29/2022    Lipid Panel 05/02/2023    Colorectal Cancer Screening 08/14/2023       Future Appointments   Date Time Provider Department Center   9/25/2024  3:00 PM Anthony Monge MD [facility]        There are no Patient Instructions on file for this visit.  No follow-ups on file.     Signature:  Anthony Monge MD      Date of encounter: 6/25/24

## 2024-06-27 DIAGNOSIS — M54.50 CHRONIC BILATERAL LOW BACK PAIN WITHOUT SCIATICA: ICD-10-CM

## 2024-06-27 DIAGNOSIS — G89.29 CHRONIC BILATERAL LOW BACK PAIN WITHOUT SCIATICA: ICD-10-CM

## 2024-06-28 RX ORDER — TRAMADOL HYDROCHLORIDE 50 MG/1
50 TABLET ORAL 2 TIMES DAILY WITH MEALS
Qty: 180 TABLET | Refills: 1 | Status: SHIPPED | OUTPATIENT
Start: 2024-06-28

## 2024-12-23 DIAGNOSIS — G89.29 CHRONIC BILATERAL LOW BACK PAIN WITHOUT SCIATICA: ICD-10-CM

## 2024-12-23 DIAGNOSIS — M54.50 CHRONIC BILATERAL LOW BACK PAIN WITHOUT SCIATICA: ICD-10-CM

## 2024-12-23 RX ORDER — TRAMADOL HYDROCHLORIDE 50 MG/1
50 TABLET ORAL 2 TIMES DAILY WITH MEALS
Qty: 180 TABLET | Refills: 1 | Status: SHIPPED | OUTPATIENT
Start: 2024-12-23

## 2025-01-14 ENCOUNTER — OFFICE VISIT (OUTPATIENT)
Dept: FAMILY MEDICINE | Facility: CLINIC | Age: 55
End: 2025-01-14
Payer: COMMERCIAL

## 2025-01-14 VITALS
HEART RATE: 70 BPM | HEIGHT: 66 IN | OXYGEN SATURATION: 98 % | WEIGHT: 199 LBS | SYSTOLIC BLOOD PRESSURE: 145 MMHG | RESPIRATION RATE: 18 BRPM | TEMPERATURE: 98 F | BODY MASS INDEX: 31.98 KG/M2 | DIASTOLIC BLOOD PRESSURE: 87 MMHG

## 2025-01-14 DIAGNOSIS — G89.29 CHRONIC BILATERAL LOW BACK PAIN WITHOUT SCIATICA: ICD-10-CM

## 2025-01-14 DIAGNOSIS — Z00.00 ROUTINE GENERAL MEDICAL EXAMINATION AT A HEALTH CARE FACILITY: Primary | ICD-10-CM

## 2025-01-14 DIAGNOSIS — E87.6 HYPOKALEMIA: ICD-10-CM

## 2025-01-14 DIAGNOSIS — M54.50 CHRONIC BILATERAL LOW BACK PAIN WITHOUT SCIATICA: ICD-10-CM

## 2025-01-14 DIAGNOSIS — Z79.899 ENCOUNTER FOR LONG-TERM (CURRENT) DRUG USE: ICD-10-CM

## 2025-01-14 DIAGNOSIS — I10 ESSENTIAL HYPERTENSION: ICD-10-CM

## 2025-01-14 LAB

## 2025-01-14 PROCEDURE — 1159F MED LIST DOCD IN RCRD: CPT | Mod: ,,, | Performed by: FAMILY MEDICINE

## 2025-01-14 PROCEDURE — 3008F BODY MASS INDEX DOCD: CPT | Mod: ,,, | Performed by: FAMILY MEDICINE

## 2025-01-14 PROCEDURE — 3079F DIAST BP 80-89 MM HG: CPT | Mod: ,,, | Performed by: FAMILY MEDICINE

## 2025-01-14 PROCEDURE — 80305 DRUG TEST PRSMV DIR OPT OBS: CPT | Mod: QW,,, | Performed by: FAMILY MEDICINE

## 2025-01-14 PROCEDURE — 3077F SYST BP >= 140 MM HG: CPT | Mod: ,,, | Performed by: FAMILY MEDICINE

## 2025-01-14 PROCEDURE — 99213 OFFICE O/P EST LOW 20 MIN: CPT | Mod: ,,, | Performed by: FAMILY MEDICINE

## 2025-01-14 RX ORDER — AMOXICILLIN 250 MG
1 CAPSULE ORAL DAILY
Qty: 30 TABLET | Refills: 6 | Status: SHIPPED | OUTPATIENT
Start: 2025-01-14

## 2025-01-14 RX ORDER — CHLORTHALIDONE 25 MG/1
25 TABLET ORAL DAILY
Qty: 90 TABLET | Refills: 3 | Status: SHIPPED | OUTPATIENT
Start: 2025-01-14

## 2025-01-14 RX ORDER — TRAMADOL HYDROCHLORIDE 50 MG/1
50 TABLET ORAL 2 TIMES DAILY WITH MEALS
Qty: 180 TABLET | Refills: 1 | Status: SHIPPED | OUTPATIENT
Start: 2025-01-14

## 2025-01-14 RX ORDER — POTASSIUM CHLORIDE 750 MG/1
10 CAPSULE, EXTENDED RELEASE ORAL DAILY
Qty: 90 CAPSULE | Refills: 3 | Status: SHIPPED | OUTPATIENT
Start: 2025-01-14

## 2025-01-14 RX ORDER — AMOXICILLIN 250 MG
1 CAPSULE ORAL DAILY
COMMUNITY
End: 2025-01-14 | Stop reason: SDUPTHER

## 2025-01-15 ENCOUNTER — PATIENT OUTREACH (OUTPATIENT)
Facility: HOSPITAL | Age: 55
End: 2025-01-15
Payer: COMMERCIAL

## 2025-01-15 NOTE — PROGRESS NOTES
Anthony Monge MD        PATIENT NAME: Celine Chandler  : 1970  DATE: 25  MRN: [id number]      Billing Provider: Anthony Monge MD  Level of Service: UT OFFICE/OUTPT VISIT, EST, LEVL III, 20-29 MIN  Patient PCP Information       Provider PCP Type    Anthony Monge MD General            Reason for Visit / Chief Complaint: Back Pain (Check up for refill)       Update PCP  Update Chief Complaint         History of Present Illness / Problem Focused Workflow     Celine Chandler presents to the clinic with Back Pain (Check up for refill)     Routine followup.  No significant interval change.  40 lb of intentional weight loss.    Back Pain  Pertinent negatives include no abdominal pain, chest pain, fever, headaches or weakness (global weakness).       Review of Systems     Review of Systems   Constitutional:  Negative for activity change, appetite change, fever and unexpected weight change.   HENT:  Negative for congestion, rhinorrhea, sinus pressure, sinus pain, sore throat and trouble swallowing.    Eyes:  Negative for photophobia, pain, discharge and visual disturbance.   Respiratory:  Negative for cough, chest tightness, wheezing and stridor.    Cardiovascular:  Negative for chest pain, palpitations and leg swelling.   Gastrointestinal:  Negative for abdominal pain, blood in stool, constipation, diarrhea and nausea.   Endocrine: Negative for polydipsia, polyphagia and polyuria.   Genitourinary:  Negative for difficulty urinating, flank pain and hematuria.   Musculoskeletal:  Positive for back pain. Negative for arthralgias and neck pain.   Skin:  Negative for rash.   Allergic/Immunologic: Negative for food allergies.   Neurological:  Negative for dizziness, tremors, seizures, syncope, weakness (global weakness) and headaches.   Psychiatric/Behavioral:  Negative for behavioral problems, confusion, decreased concentration, dysphoric mood and hallucinations. The patient is not nervous/anxious.         Medical /  Social / Family History     Past Medical History:   Diagnosis Date    Arthritis     Hypertension        Past Surgical History:   Procedure Laterality Date     SECTION      CHOLECYSTECTOMY         Social History  Ms.  reports that she has never smoked. She has never used smokeless tobacco. She reports that she does not currently use alcohol. She reports that she does not use drugs.    Family History  Ms.'s family history includes Cancer in her mother; Hypertension in her father and mother.    Medications and Allergies     Medications  No outpatient medications have been marked as taking for the 25 encounter (Office Visit) with Anthony Monge MD.       Allergies  Review of patient's allergies indicates:  No Known Allergies    Physical Examination     Vitals:    25 1548   BP: (!) 145/87   Pulse: 70   Resp: 18   Temp: 98.2 °F (36.8 °C)     Physical Exam  Constitutional:       General: She is not in acute distress.     Appearance: Normal appearance.   HENT:      Head: Normocephalic.      Right Ear: Tympanic membrane and ear canal normal.      Left Ear: Tympanic membrane and ear canal normal.      Nose: Nose normal.      Mouth/Throat:      Mouth: Mucous membranes are moist.      Pharynx: No oropharyngeal exudate.   Eyes:      Extraocular Movements: Extraocular movements intact.      Pupils: Pupils are equal, round, and reactive to light.   Cardiovascular:      Rate and Rhythm: Normal rate and regular rhythm.      Heart sounds: No murmur heard.  Pulmonary:      Effort: Pulmonary effort is normal.      Breath sounds: Normal breath sounds. No wheezing.   Abdominal:      General: Abdomen is flat. Bowel sounds are normal.      Palpations: Abdomen is soft.      Hernia: No hernia is present.   Musculoskeletal:         General: Normal range of motion.      Cervical back: Normal range of motion and neck supple.      Right lower leg: No edema.      Left lower leg: No edema.   Lymphadenopathy:      Cervical: No  cervical adenopathy.   Skin:     General: Skin is warm and dry.      Coloration: Skin is not jaundiced.      Findings: No lesion.   Neurological:      General: No focal deficit present.      Mental Status: She is alert and oriented to person, place, and time.      Cranial Nerves: No cranial nerve deficit.      Gait: Gait normal.   Psychiatric:         Mood and Affect: Mood normal.         Behavior: Behavior normal.         Judgment: Judgment normal.          Assessment and Plan (including Health Maintenance)      Problem List  Smart Sets  Document Outside HM   :    Plan:     1. Encounter for long-term (current) drug use  The current medical regimen is effective;  continue present plan and medications.  -     POCT Urine Drug Screen Presump    2. Chronic bilateral low back pain without sciatica  The current medical regimen is effective;  continue present plan and medications.  -     traMADoL (ULTRAM) 50 mg tablet; Take 1 tablet (50 mg total) by mouth 2 (two) times daily with meals.  Dispense: 180 tablet; Refill: 1    3. Hypokalemia  The current medical regimen is effective;  continue present plan and medications.  -     potassium chloride (MICRO-K) 10 MEQ CpSR; Take 1 capsule (10 mEq total) by mouth once daily.  Dispense: 90 capsule; Refill: 3    4. Essential hypertension  The current medical regimen is effective;  continue present plan and medications.  -     chlorthalidone (HYGROTEN) 25 MG Tab; Take 1 tablet (25 mg total) by mouth once daily.  Dispense: 90 tablet; Refill: 3    Other orders  -     senna-docusate 8.6-50 mg (PERICOLACE) 8.6-50 mg per tablet; Take 1 tablet by mouth once daily.  Dispense: 30 tablet; Refill: 6          Health Maintenance Due   Topic Date Due    Hepatitis C Screening  Never done    Cervical Cancer Screening  Never done    HIV Screening  Never done    TETANUS VACCINE  Never done    Mammogram  Never done    Hemoglobin A1c (Diabetic Prevention Screening)  Never done    Shingles Vaccine (1 of  2) Never done    Pneumococcal Vaccines (Age 50+) (1 of 1 - PCV) Never done    Influenza Vaccine (1) 09/01/2024    COVID-19 Vaccine (1 - 2024-25 season) Never done       1. Chronic bilateral low back pain without sciatica  -     traMADoL (ULTRAM) 50 mg tablet; Take 1 tablet (50 mg total) by mouth 2 (two) times daily with meals.  Dispense: 180 tablet; Refill: 1    2. Hypokalemia  -     potassium chloride (MICRO-K) 10 MEQ CpSR; Take 1 capsule (10 mEq total) by mouth once daily.  Dispense: 90 capsule; Refill: 3    3. Essential hypertension  -     chlorthalidone (HYGROTEN) 25 MG Tab; Take 1 tablet (25 mg total) by mouth once daily.  Dispense: 90 tablet; Refill: 3    4. Encounter for long-term (current) drug use  -     POCT Urine Drug Screen Presump    Other orders  -     senna-docusate 8.6-50 mg (PERICOLACE) 8.6-50 mg per tablet; Take 1 tablet by mouth once daily.  Dispense: 30 tablet; Refill: 6         Health Maintenance Topics with due status: Not Due       Topic Last Completion Date    Lipid Panel 05/02/2023    Colorectal Cancer Screening 08/14/2023    RSV Vaccine (Age 60+ and Pregnant patients) Not Due       Future Appointments   Date Time Provider Department Center   1/12/2026  3:30 PM Anthony Monge MD [facility]        There are no Patient Instructions on file for this visit.  Follow up in about 3 months (around 4/14/2025) for routine followup.     Signature:  Anthony Monge MD      Date of encounter: 1/14/25

## 2025-01-15 NOTE — PROGRESS NOTES
Population Health Chart Review & Patient Outreach Details      Further Action Needed If Patient Returns Outreach:            Updates Requested / Reviewed:     []  Care Everywhere    []     []  External Sources ([facility], [facility], [facility], etc.)    [] [facility]   [] [facility]   [] Other:    []  Care Team Updated   []  Removed  or Duplicate Orders   []  Immunization Reconciliation Completed / Queried    [] [place]   [] [place]   [] [place]   [] [place]      Health Maintenance Topics Addressed and Outreach Outcomes / Actions Taken:             Breast Cancer Screening []  Mammogram Order Placed    []  Mammogram Screening Scheduled    []  External Records Requested & Care Team Updated if Applicable    []  External Records Uploaded & Care Team Updated if Applicable    []  Pt Declined Scheduling Mammogram    []  Pt Will Schedule with External Provider / Order Routed & Care Team Updated if Applicable              Cervical Cancer Screening []  Pap Smear Scheduled in Primary Care or OBGYN    []  External Records Requested & Care Team Updated if Applicable       []  External Records Uploaded, Care Team Updated, & History Updated if Applicable    []  Patient Declined Scheduling Pap Smear    []  Patient Will Schedule with External Provider & Care Team Updated if Applicable                  Colorectal Cancer Screening []  Colonoscopy Case Request / Referral / Home Test Order Placed    []  External Records Requested & Care Team Updated if Applicable    []  External Records Uploaded, Care Team Updated, & History Updated if Applicable    []  Patient Declined Completing Colon Cancer Screening    []  Patient Will Schedule with External Provider & Care Team Updated if Applicable    []  Fit Kit Mailed (add the SmartPhrase under additional notes)    []  Reminded Patient to Complete Home Test                Diabetic Eye Exam []  Eye Exam Screening Order Placed    []  Eye Camera Scheduled or Optometry/Ophthalmology Referral  Placed    []  External Records Requested & Care Team Updated if Applicable    []  External Records Uploaded, Care Team Updated, & History Updated if Applicable    []  Patient Declined Scheduling Eye Exam    []  Patient Will Schedule with External Provider & Care Team Updated if Applicable             Blood Pressure Control []  Primary Care Follow Up Visit Scheduled     []  Remote Blood Pressure Reading Captured    []  Patient Declined Remote Reading or Scheduling Appt - Escalated to PCP    []  Patient Will Call Back or Send Portal Message with Reading                 HbA1c & Other Labs []  Overdue Lab(s) Ordered    []  Overdue Lab(s) Scheduled    []  External Records Uploaded & Care Team Updated if Applicable    []  Primary Care Follow Up Visit Scheduled     []  Reminded Patient to Complete A1c Home Test    []  Patient Declined Scheduling Labs or Will Call Back to Schedule    []  Patient Will Schedule with External Provider / Order Routed, & Care Team Updated if Applicable           Primary Care Appointment []  Primary Care Appt Scheduled    []  Patient Declined Scheduling or Will Call Back to Schedule    []  Pt Established with External Provider, Updated Care Team, & Informed Pt to Notify Payor if Applicable           Medication Adherence /    Statin Use []  Primary Care Appointment Scheduled    []  Patient Reminded to  Prescription    []  Patient Declined, Provider Notified if Needed    []  Sent Provider Message to Review to Evaluate Pt for Statin, Add Exclusion Dx Codes, Document   Exclusion in Problem List, Change Statin Intensity Level to Moderate or High Intensity if Applicable                Osteoporosis Screening []  Dexa Order Placed    []  Dexa Appointment Scheduled    []  External Records Requested & Care Team Updated    []  External Records Uploaded, Care Team Updated, & History Updated if Applicable    []  Patient Declined Scheduling Dexa or Will Call Back to Schedule    []  Patient Will Schedule  with External Provider / Order Routed & Care Team Updated if Applicable       Additional Notes:.  Post visit Population Health review of encounter with date of service  1/14/25 with [facility].  Not All required HY components in encounter.  Done as back pain message sent needs labs,z00.00 or z00.01 as dx .   Followup appt for: 1/12/26 [facility]

## 2025-01-27 ENCOUNTER — PATIENT OUTREACH (OUTPATIENT)
Facility: HOSPITAL | Age: 55
End: 2025-01-27
Payer: COMMERCIAL

## 2025-01-27 NOTE — PROGRESS NOTES
Population Health Chart Review & Patient Outreach Details    January 2025 Breast Cancer Screening Report    Updates Requested / Reviewed:  [x]  Care Team Updated    Health Maintenance Topics Addressed and Outreach Outcomes / Actions Taken:  Breast Cancer Screening [x] Telephone Outreach. Declines screening at this time.

## 2025-08-12 ENCOUNTER — PATIENT OUTREACH (OUTPATIENT)
Facility: HOSPITAL | Age: 55
End: 2025-08-12
Payer: COMMERCIAL

## 2025-08-12 ENCOUNTER — PATIENT MESSAGE (OUTPATIENT)
Facility: HOSPITAL | Age: 55
End: 2025-08-12
Payer: COMMERCIAL

## 2025-08-12 VITALS — SYSTOLIC BLOOD PRESSURE: 120 MMHG | DIASTOLIC BLOOD PRESSURE: 70 MMHG
